# Patient Record
Sex: FEMALE | Race: WHITE | NOT HISPANIC OR LATINO | URBAN - METROPOLITAN AREA
[De-identification: names, ages, dates, MRNs, and addresses within clinical notes are randomized per-mention and may not be internally consistent; named-entity substitution may affect disease eponyms.]

---

## 2023-03-31 ENCOUNTER — EMERGENCY (EMERGENCY)
Facility: HOSPITAL | Age: 43
LOS: 1 days | Discharge: HOME | End: 2023-03-31
Attending: STUDENT IN AN ORGANIZED HEALTH CARE EDUCATION/TRAINING PROGRAM | Admitting: STUDENT IN AN ORGANIZED HEALTH CARE EDUCATION/TRAINING PROGRAM
Payer: COMMERCIAL

## 2023-03-31 VITALS
OXYGEN SATURATION: 100 % | HEART RATE: 100 BPM | DIASTOLIC BLOOD PRESSURE: 68 MMHG | SYSTOLIC BLOOD PRESSURE: 124 MMHG | RESPIRATION RATE: 18 BRPM | TEMPERATURE: 99 F

## 2023-03-31 PROCEDURE — 99284 EMERGENCY DEPT VISIT MOD MDM: CPT

## 2023-03-31 NOTE — ED ADULT TRIAGE NOTE - CHIEF COMPLAINT QUOTE
r/o Bartholin cyst    pt c/o pain and swelling since Wednesday.. worse today.   pmhx- bartholin cyst

## 2023-04-01 VITALS
TEMPERATURE: 100 F | SYSTOLIC BLOOD PRESSURE: 110 MMHG | HEART RATE: 96 BPM | DIASTOLIC BLOOD PRESSURE: 66 MMHG | RESPIRATION RATE: 18 BRPM | OXYGEN SATURATION: 100 %

## 2023-04-01 LAB
ALBUMIN SERPL ELPH-MCNC: 4 G/DL — SIGNIFICANT CHANGE UP (ref 3.3–5)
ALP SERPL-CCNC: 51 U/L — SIGNIFICANT CHANGE UP (ref 40–120)
ALT FLD-CCNC: 15 U/L — SIGNIFICANT CHANGE UP (ref 4–33)
ANION GAP SERPL CALC-SCNC: 10 MMOL/L — SIGNIFICANT CHANGE UP (ref 7–14)
AST SERPL-CCNC: 14 U/L — SIGNIFICANT CHANGE UP (ref 4–32)
BILIRUB SERPL-MCNC: 0.6 MG/DL — SIGNIFICANT CHANGE UP (ref 0.2–1.2)
BUN SERPL-MCNC: 4 MG/DL — LOW (ref 7–23)
CALCIUM SERPL-MCNC: 8.7 MG/DL — SIGNIFICANT CHANGE UP (ref 8.4–10.5)
CHLORIDE SERPL-SCNC: 105 MMOL/L — SIGNIFICANT CHANGE UP (ref 98–107)
CO2 SERPL-SCNC: 22 MMOL/L — SIGNIFICANT CHANGE UP (ref 22–31)
CREAT SERPL-MCNC: 0.65 MG/DL — SIGNIFICANT CHANGE UP (ref 0.5–1.3)
EGFR: 113 ML/MIN/1.73M2 — SIGNIFICANT CHANGE UP
GLUCOSE SERPL-MCNC: 106 MG/DL — HIGH (ref 70–99)
HCT VFR BLD CALC: 36.2 % — SIGNIFICANT CHANGE UP (ref 34.5–45)
HGB BLD-MCNC: 11.8 G/DL — SIGNIFICANT CHANGE UP (ref 11.5–15.5)
MCHC RBC-ENTMCNC: 31.5 PG — SIGNIFICANT CHANGE UP (ref 27–34)
MCHC RBC-ENTMCNC: 32.6 GM/DL — SIGNIFICANT CHANGE UP (ref 32–36)
MCV RBC AUTO: 96.5 FL — SIGNIFICANT CHANGE UP (ref 80–100)
NRBC # BLD: 0 /100 WBCS — SIGNIFICANT CHANGE UP (ref 0–0)
NRBC # FLD: 0 K/UL — SIGNIFICANT CHANGE UP (ref 0–0)
PLATELET # BLD AUTO: 187 K/UL — SIGNIFICANT CHANGE UP (ref 150–400)
POTASSIUM SERPL-MCNC: 3.7 MMOL/L — SIGNIFICANT CHANGE UP (ref 3.5–5.3)
POTASSIUM SERPL-SCNC: 3.7 MMOL/L — SIGNIFICANT CHANGE UP (ref 3.5–5.3)
PROT SERPL-MCNC: 6.6 G/DL — SIGNIFICANT CHANGE UP (ref 6–8.3)
RBC # BLD: 3.75 M/UL — LOW (ref 3.8–5.2)
RBC # FLD: 12.9 % — SIGNIFICANT CHANGE UP (ref 10.3–14.5)
SODIUM SERPL-SCNC: 137 MMOL/L — SIGNIFICANT CHANGE UP (ref 135–145)
WBC # BLD: 15.54 K/UL — HIGH (ref 3.8–10.5)
WBC # FLD AUTO: 15.54 K/UL — HIGH (ref 3.8–10.5)

## 2023-04-01 RX ORDER — SODIUM CHLORIDE 9 MG/ML
1000 INJECTION, SOLUTION INTRAVENOUS ONCE
Refills: 0 | Status: COMPLETED | OUTPATIENT
Start: 2023-04-01 | End: 2023-04-01

## 2023-04-01 RX ORDER — MORPHINE SULFATE 50 MG/1
4 CAPSULE, EXTENDED RELEASE ORAL ONCE
Refills: 0 | Status: DISCONTINUED | OUTPATIENT
Start: 2023-04-01 | End: 2023-04-01

## 2023-04-01 RX ORDER — ONDANSETRON 8 MG/1
4 TABLET, FILM COATED ORAL ONCE
Refills: 0 | Status: COMPLETED | OUTPATIENT
Start: 2023-04-01 | End: 2023-04-01

## 2023-04-01 RX ORDER — LIDOCAINE HYDROCHLORIDE AND EPINEPHRINE 10; 10 MG/ML; UG/ML
10 INJECTION, SOLUTION INFILTRATION; PERINEURAL ONCE
Refills: 0 | Status: DISCONTINUED | OUTPATIENT
Start: 2023-04-01 | End: 2023-04-01

## 2023-04-01 RX ORDER — KETOROLAC TROMETHAMINE 30 MG/ML
30 SYRINGE (ML) INJECTION ONCE
Refills: 0 | Status: DISCONTINUED | OUTPATIENT
Start: 2023-04-01 | End: 2023-04-01

## 2023-04-01 RX ORDER — LIDOCAINE HCL 20 MG/ML
10 VIAL (ML) INJECTION ONCE
Refills: 0 | Status: DISCONTINUED | OUTPATIENT
Start: 2023-04-01 | End: 2023-04-04

## 2023-04-01 RX ADMIN — MORPHINE SULFATE 4 MILLIGRAM(S): 50 CAPSULE, EXTENDED RELEASE ORAL at 00:53

## 2023-04-01 RX ADMIN — Medication 1 TABLET(S): at 03:50

## 2023-04-01 RX ADMIN — MORPHINE SULFATE 4 MILLIGRAM(S): 50 CAPSULE, EXTENDED RELEASE ORAL at 01:08

## 2023-04-01 RX ADMIN — SODIUM CHLORIDE 1000 MILLILITER(S): 9 INJECTION, SOLUTION INTRAVENOUS at 00:53

## 2023-04-01 RX ADMIN — ONDANSETRON 4 MILLIGRAM(S): 8 TABLET, FILM COATED ORAL at 05:06

## 2023-04-01 RX ADMIN — Medication 30 MILLIGRAM(S): at 00:53

## 2023-04-01 RX ADMIN — Medication 30 MILLIGRAM(S): at 01:08

## 2023-04-01 NOTE — CONSULT NOTE ADULT - SUBJECTIVE AND OBJECTIVE BOX
GYN Consult Note: Incomplete     42y G_P_  LMP * presents with   HPI:      OB/GYN HISTORY:   Physician:   Ob:   - G1:   - G2:   Gyn: Denies fibroids, cysts, PCOS, endometriosis, or history of genital lesions   PMH: Denies  PAST MEDICAL & SURGICAL HISTORY:    PSH: Denies   Meds:  MEDICATIONS  (STANDING):  lidocaine 1% Injectable 10 milliLiter(s) Local Injection Once    MEDICATIONS  (PRN):    Allergies:   Allergies    No Known Allergies    Intolerances          REVIEW OF SYSTEMS  General: denies fevers, chills, tiredness  Skin/Breast: denies breast pain  Respiratory and Thorax: denies shortness of breath or cough  Cardiovascular: denies chest pain, palpitations  Gastrointestinal: denies abdominal pain, nausea/ vomiting	  Genitourinary: denies dysuria, increased urinary frequency, urgency, abnormal vaginal discharge,   Constitutional, Cardiovascular, Respiratory, Gastrointestinal, Genitourinary, Musculoskeletal and Integumentary review of systems are normal except as noted. 	      Vital Signs Last 24 Hrs  T(C): 37.7 (01 Apr 2023 00:59), Max: 37.7 (01 Apr 2023 00:59)  T(F): 99.9 (01 Apr 2023 00:59), Max: 99.9 (01 Apr 2023 00:59)  HR: 98 (01 Apr 2023 00:59) (98 - 100)  BP: 111/62 (01 Apr 2023 00:59) (111/62 - 124/68)  BP(mean): --  RR: 18 (01 Apr 2023 00:59) (18 - 18)  SpO2: 100% (01 Apr 2023 00:59) (100% - 100%)    Parameters below as of 01 Apr 2023 00:59  Patient On (Oxygen Delivery Method): room air        PHYSICAL EXAM: Chaperoned w/ RN at bedside.   Gen: NAD, alert and oriented x 3  Cardiovascular: regular   Respiratory: breathing comfortably on RA  Abd: soft, non tender, non-distended  Pelvic: closed/long, no CMT, Uterus: normal size, non tender  Adnexa: non tender, no palpable masses  Extremities: NTBL  Skin: warm and well perfused      LABS:                RADIOLOGY & ADDITIONAL STUDIES:   GYN Consult Note: Incomplete - recs at bottom.     42y G_P_  LMP * presents with   HPI:      OB/GYN HISTORY:   Physician:   Ob:   - G1:   - G2:   Gyn: Denies fibroids, cysts, PCOS, endometriosis, or history of genital lesions   PMH: Denies  PAST MEDICAL & SURGICAL HISTORY:    PSH: Denies   Meds:  MEDICATIONS  (STANDING):  lidocaine 1% Injectable 10 milliLiter(s) Local Injection Once    MEDICATIONS  (PRN):    Allergies:   Allergies    No Known Allergies    Intolerances          REVIEW OF SYSTEMS  General: denies fevers, chills, tiredness  Skin/Breast: denies breast pain  Respiratory and Thorax: denies shortness of breath or cough  Cardiovascular: denies chest pain, palpitations  Gastrointestinal: denies abdominal pain, nausea/ vomiting	  Genitourinary: denies dysuria, increased urinary frequency, urgency, abnormal vaginal discharge,   Constitutional, Cardiovascular, Respiratory, Gastrointestinal, Genitourinary, Musculoskeletal and Integumentary review of systems are normal except as noted. 	      Vital Signs Last 24 Hrs  T(C): 37.7 (01 Apr 2023 00:59), Max: 37.7 (01 Apr 2023 00:59)  T(F): 99.9 (01 Apr 2023 00:59), Max: 99.9 (01 Apr 2023 00:59)  HR: 98 (01 Apr 2023 00:59) (98 - 100)  BP: 111/62 (01 Apr 2023 00:59) (111/62 - 124/68)  BP(mean): --  RR: 18 (01 Apr 2023 00:59) (18 - 18)  SpO2: 100% (01 Apr 2023 00:59) (100% - 100%)    Parameters below as of 01 Apr 2023 00:59  Patient On (Oxygen Delivery Method): room air        PHYSICAL EXAM: Chaperoned w/ RN at bedside.   Gen: NAD, alert and oriented x 3  Cardiovascular: regular   Respiratory: breathing comfortably on RA  Abd: soft, non tender, non-distended  Pelvic: closed/long, no CMT, Uterus: normal size, non tender  Adnexa: non tender, no palpable masses  Extremities: NTBL  Skin: warm and well perfused      LABS:                RADIOLOGY & ADDITIONAL STUDIES:   GYN Consult Note: Incomplete - recs at bottom.     42y   presents with Bartholin gland abscess   HPI: Pt reports a chronic history of bartholin gland cyst/ abscess Pt reports a prior history of prior Bartholin gland absces requiring incision and drainage utilizing a word catheter previously. Pt reports that she has a palpable cyst at in her left labia that is non tender at a baseline, but does report that sometimes she can not feel it. Pt was previously recommended to undergo Bartholin gland excision with concern for "to much scar tissue" and recurrence of the problem with repeat I&D's. Pt is not interested in surgical excision and has been following with an osteopathic doctor who has preformed intravaginal manipulation. Pt reports satisfaction with this approach as she has not had an occurrence in the last 5 years. Most recently patient states the lesion began to become progressive in size and more painful since Thursday evening. Pt reports a 45 min sitz bath on Friday morning. Denies any spontaneous drainage. Pain has progressed that OTC medications brings little relief and patient has pain with ambulation. Pt was advised by her private doctor to present to the ED for antibiotics.     OB/GYN HISTORY:   Physician: Utilizes [facility] urgent care in [place] for gyn care. Previously a patient of Dr. Cortes (last seen- )   Ob:   - G1: C/S,    - G2: GABI w/ D&C,    - G3: C/S,    Gyn: As above.   PMH: Denies    PSH: C/S (, )   Meds: Vitamins   Allergies: No Known Allergies    REVIEW OF SYSTEMS  General: Denies fevers, chills,   Gastrointestinal: denies abdominal pain, nausea/ vomiting	  Genitourinary: denies dysuria, increased urinary frequency, urgency, Able to void spontaneously with a normal stream.   Constitutional, Cardiovascular, Respiratory, Gastrointestinal, Genitourinary, Musculoskeletal and Integumentary review of systems are normal except as noted. 	      Vital Signs Last 24 Hrs  T(C): 37.7 (2023 00:59), Max: 37.7 (2023 00:59)  T(F): 99.9 (2023 00:59), Max: 99.9 (2023 00:59)  HR: 98 (2023 00:59) (98 - 100)  BP: 111/62 (2023 00:59) (111/62 - 124/68)  BP(mean): --  RR: 18 (2023 00:59) (18 - 18)  SpO2: 100% (2023 00:59) (100% - 100%)    Parameters below as of 2023 00:59  Patient On (Oxygen Delivery Method): room air    PHYSICAL EXAM:   Gen: NAD, alert and oriented x 3  Cardiovascular: regular   Respiratory: breathing comfortably on RA  Pelvic: 3cm by 3cm bartholin gland abscess. Tender to palpation. Tissue without tension or area of drainage. Concern for induration.   Extremities: NTBL  Skin: warm and well perfused      LABS:              RADIOLOGY & ADDITIONAL STUDIES:   GYN Consult Note: Incomplete - recs at bottom.     42y   presents with Bartholin gland abscess   HPI: Pt reports a chronic history of bartholin gland cyst/ abscess Pt reports a prior history of prior Bartholin gland abscess requiring incision and drainage utilizing a word catheter previously. Pt reports that she has a palpable cyst at in her left labia that is non tender at a baseline, but does report that sometimes she can not feel it. Pt was previously recommended to undergo Bartholin gland excision with concern for "too much scar tissue" and recurrence of the problem with repeat I&D's. Pt is not interested in surgical excision and has been following with an osteopathic doctor who has preformed intravaginal manipulation. Pt reports satisfaction with this approach as she has not had an occurrence in the last 5 years. Most recently patient states the lesion began to become progressive in size and more painful since Thursday evening. Pt reports a 45 min sitz bath on Friday morning only. Denies any spontaneous drainage. Pain has progressed that OTC medications brings little relief and patient has pain with ambulation. Pt was advised by her private doctor to present to the ED for antibiotics.     OB/GYN HISTORY:   Physician: Utilizes GYN urgent care in [place] for gyn care. Previously a patient of Dr. Cortes (last seen- )   Ob:   - G1: C/S,    - G2: GABI w/ D&C,    - G3: C/S,    Gyn: As above.   PMH: Denies    PSH: C/S (, )   Meds: Vitamins   Allergies: No Known Allergies    REVIEW OF SYSTEMS  General: Denies fevers, chills,   Gastrointestinal: denies abdominal pain, nausea/ vomiting	  Genitourinary: denies dysuria, increased urinary frequency, urgency, Able to void spontaneously with a normal stream.   Constitutional, Cardiovascular, Respiratory, Gastrointestinal, Genitourinary, Musculoskeletal and Integumentary review of systems are normal except as noted. 	      Vital Signs Last 24 Hrs  T(C): 37.7 (2023 00:59), Max: 37.7 (2023 00:59)  T(F): 99.9 (2023 00:59), Max: 99.9 (2023 00:59)  HR: 98 (2023 00:59) (98 - 100)  BP: 111/62 (2023 00:59) (111/62 - 124/68)  BP(mean): --  RR: 18 (2023 00:59) (18 - 18)  SpO2: 100% (2023 00:59) (100% - 100%)    Parameters below as of 2023 00:59  Patient On (Oxygen Delivery Method): room air    PHYSICAL EXAM:   Gen: NAD, alert and oriented x 3  Cardiovascular: regular   Respiratory: breathing comfortably on RA  Pelvic: 3cm by 3cm bartholin gland cyst, mild to moderate tenderness to palpation. Tissue without tension or area of pointing,  Concern for induration.   Extremities: NTBL  Skin: warm and well perfused              RADIOLOGY & ADDITIONAL STUDIES:

## 2023-04-01 NOTE — ED ADULT NURSE NOTE - OBJECTIVE STATEMENT
Received pt in intake room 9 with right Bartholin's cyst. Patient alert and oriented x3 . Placed 20 G IV to the Left AC. Meds given as ordered. IVF infusing well.

## 2023-04-01 NOTE — ED PROVIDER NOTE - CLINICAL SUMMARY MEDICAL DECISION MAKING FREE TEXT BOX
42-year-old female past medical history of Bartholin gland cyst 5 to 6 years ago requiring drainage who is presenting to the emergency department with pain in the left labia.  Feels like the last time she had a Bartholin cyst.  Pain is severe.  She also has generalized malaise.  Has been going on for 5 to 6 days.  Took some Advil for the pain but did not help.  Patient is uncomfortable appearing, vital signs are within normal limits.  Patient has an area of fluctuance in the left inner labia consistent with Bartholin gland cyst.  It is tender to palpation.  Due to the inability to find a Word catheter in the OR, will consult gynecology for drainage of cyst given the fact they likely have a Word catheter.  Furthermore patient is requesting gynecology consultationControl with morphine and Toradol in advance of procedure.  We will give a liter of empiric fluids as well.  Anticipate discharge with OB follow-up.

## 2023-04-01 NOTE — ED PROVIDER NOTE - NSFOLLOWUPINSTRUCTIONS_ED_ALL_ED_FT
take acetaminophen 650 mg every 6 hours and ibuprofen 400 mg every 6 hours with food. Both of these medications work differently to treat pain and inflammation. Please note that these medications are both available over the counter at most pharmacies without a doctor's prescription.    Bartholin Cyst    WHAT YOU NEED TO KNOW:    A Bartholin cyst is a lump near the opening to your vagina. You may have pain in this area when you walk or have sex. A Bartholin cyst is caused by blockage of your Bartholin gland. You have a Bartholin gland on each side of your vagina. The glands produce fluid to moisten your vagina. Over time the fluid can build up in the gland and form a cyst. The cyst may become infected. You may be at risk for a Bartholin cyst if you have a sexually transmitted infection. An injury or surgery near your vagina may also increase you risk.    DISCHARGE INSTRUCTIONS:    Contact your gynecologist or healthcare provider if:    You have a fever.    Your cyst gets larger or becomes more painful.    Your cyst returns after treatment.    Your drain falls out.    You have pus, redness, or swelling where the cyst was drained.    You have questions or concerns about your condition or care.  Medicines: You may need any of the following:    Antibiotics help prevent or treat a bacterial infection.    NSAIDs, such as ibuprofen, help decrease swelling, pain, and fever. NSAIDs can cause stomach bleeding or kidney problems in certain people. If you take blood thinner medicine, always ask your healthcare provider if NSAIDs are safe for you. Always read the medicine label and follow directions.    Take your medicine as directed. Contact your healthcare provider if you think your medicine is not helping or if you have side effects. Tell him or her if you are allergic to any medicine. Keep a list of the medicines, vitamins, and herbs you take. Include the amounts, and when and why you take them. Bring the list or the pill bottles to follow-up visits. Carry your medicine list with you in case of an emergency.  Self-care if your cyst was not drained:    Take a sitz bath 3 to 4 times each day or as directed. A sitz bath may help relieve swelling and pain. It will also help open your Bartholin glands so they drain normally. Place a clean towel on the bottom of your bath tub. Fill your bath tub with warm water up to your hips. You can also buy a sitz bath that fits in your toilet. Sit in the water for 10 minutes.    Apply a warm compress to your cyst. This may relieve swelling and pain. A warm compress will also help open your Bartholin glands so they drain normally. Wet a washcloth in warm, but not hot, water. Apply the compress for 10 minutes. Repeat 4 times each day.    Keep the area around your vagina clean. Always wipe front to back. Shower once a day. Gently pat the area dry after a shower.  Self-care after an incision and drainage of your cyst:    Take a sitz bath in 24 to 48 hours or as directed. You may need to wait to take a sitz bath until after your packing is removed. A sitz bath may help relieve swelling and pain. Take a sitz bath 3 to 4 times each day for 3 days. Place a clean towel on the bottom of your bath tub. Fill your bath tub with warm water up to your hips. You can also buy a sitz bath that fits in your toilet. Sit in the water for 10 minutes.    Wear a sanitary pad to absorb drainage from your wound. You may have drainage for a few weeks after your cyst is drained.    Ask your healthcare provider if it is okay to have sex. Sex may cause your drain to fall out. It may also increase your risk for an infection.    Keep the area around your vagina clean. Always wipe front to back. Shower once a day. Gently pat the area dry after a shower.  Follow up with your healthcare provider as directed: If packing was placed in your wound, return in 24 to 48 hours to have it removed. If a drain was placed, you will need to return in 4 to 6 weeks to have it removed. Write down your questions so you remember to ask them during your visits. establish care with an OB/GYN this week. Would recommend [facility] [phone]) or [facility] ()    take acetaminophen 650 mg every 6 hours and ibuprofen 400 mg every 6 hours with food. Both of these medications work differently to treat pain and inflammation. Please note that these medications are both available over the counter at most pharmacies without a doctor's prescription.    Bartholin Cyst    WHAT YOU NEED TO KNOW:    A Bartholin cyst is a lump near the opening to your vagina. You may have pain in this area when you walk or have sex. A Bartholin cyst is caused by blockage of your Bartholin gland. You have a Bartholin gland on each side of your vagina. The glands produce fluid to moisten your vagina. Over time the fluid can build up in the gland and form a cyst. The cyst may become infected. You may be at risk for a Bartholin cyst if you have a sexually transmitted infection. An injury or surgery near your vagina may also increase you risk.    DISCHARGE INSTRUCTIONS:    Contact your gynecologist or healthcare provider if:    You have a fever.    Your cyst gets larger or becomes more painful.    Your cyst returns after treatment.    Your drain falls out.    You have pus, redness, or swelling where the cyst was drained.    You have questions or concerns about your condition or care.  Medicines: You may need any of the following:    Antibiotics help prevent or treat a bacterial infection.    NSAIDs, such as ibuprofen, help decrease swelling, pain, and fever. NSAIDs can cause stomach bleeding or kidney problems in certain people. If you take blood thinner medicine, always ask your healthcare provider if NSAIDs are safe for you. Always read the medicine label and follow directions.    Take your medicine as directed. Contact your healthcare provider if you think your medicine is not helping or if you have side effects. Tell him or her if you are allergic to any medicine. Keep a list of the medicines, vitamins, and herbs you take. Include the amounts, and when and why you take them. Bring the list or the pill bottles to follow-up visits. Carry your medicine list with you in case of an emergency.  Self-care if your cyst was not drained:    Take a sitz bath 3 to 4 times each day or as directed. A sitz bath may help relieve swelling and pain. It will also help open your Bartholin glands so they drain normally. Place a clean towel on the bottom of your bath tub. Fill your bath tub with warm water up to your hips. You can also buy a sitz bath that fits in your toilet. Sit in the water for 10 minutes.    Apply a warm compress to your cyst. This may relieve swelling and pain. A warm compress will also help open your Bartholin glands so they drain normally. Wet a washcloth in warm, but not hot, water. Apply the compress for 10 minutes. Repeat 4 times each day.    Keep the area around your vagina clean. Always wipe front to back. Shower once a day. Gently pat the area dry after a shower.  Self-care after an incision and drainage of your cyst:    Take a sitz bath in 24 to 48 hours or as directed. You may need to wait to take a sitz bath until after your packing is removed. A sitz bath may help relieve swelling and pain. Take a sitz bath 3 to 4 times each day for 3 days. Place a clean towel on the bottom of your bath tub. Fill your bath tub with warm water up to your hips. You can also buy a sitz bath that fits in your toilet. Sit in the water for 10 minutes.    Wear a sanitary pad to absorb drainage from your wound. You may have drainage for a few weeks after your cyst is drained.    Ask your healthcare provider if it is okay to have sex. Sex may cause your drain to fall out. It may also increase your risk for an infection.    Keep the area around your vagina clean. Always wipe front to back. Shower once a day. Gently pat the area dry after a shower.  Follow up with your healthcare provider as directed: If packing was placed in your wound, return in 24 to 48 hours to have it removed. If a drain was placed, you will need to return in 4 to 6 weeks to have it removed. Write down your questions so you remember to ask them during your visits.

## 2023-04-01 NOTE — CONSULT NOTE ADULT - ASSESSMENT
- Pt needs to establish care with an OB/GYN this week. Would recommend [facility] [phone]) or [facility] ()      Pt reports a chronic history of bartholin gland cyst/ abscess presenting today with recurrence and symptom progression over the last 24 hours.     - Discussed the recurrent nature of the problem and the recommendation for a Bartholin gland excision was reviewed. Pt made aware that in the setting of recurrence in women over 40 a biopsy is recommended due to the increased risk of malignancy.  - Discussed the use of conservative therapy with antibiotics and sitz baths verses marsupialization of the bartholin gland at bedside. Risks and benefits of options were discussed.  Pt with inadequate trial of conservative therapy. Plan for patient to be discharged home with Augmentin 875mg BID x7 days with sitz baths TID or more frequent as tolerated. Pt will follow up with osteopathic doctor.   - Pt is afebrile, but demonstrates a leukocytosis to 15. Pt needs to establish care with an OB/GYN this week. Would recommend [facility] [phone]) or [facility] ()    Pt seen with Dr. Itzel Cortez, PGY-2   Pt reports a chronic history of bartholin gland cyst/ abscess presenting today with recurrence and symptom progression over the last 24 hours.     - Discussed the recurrent nature of the problem and the recommendation for a Bartholin gland excision was reviewed. Pt made aware that in the setting of recurrence in women over 40 a biopsy is recommended due to the increased risk of malignancy.  - Discussed the use of conservative therapy with antibiotics and sitz baths verses marsupialization of the bartholin gland at bedside. Risks and benefits of options were discussed.  Pt with inadequate trial of conservative therapy. Plan for patient to be discharged home with Augmentin 875mg BID x7 days with sitz baths TID or more frequent as tolerated. Pt will follow up with osteopathic doctor with whom pt had been getting care for this problem.   - Pt is afebrile, but demonstrates a leukocytosis to 15. Pt needs to establish care with an OB/GYN this week. Would recommend [facility] [phone]) or [facility] ()    Pt seen and examined with Dr. Itzel Cortez, PGY-2     ATT:  This pt was seen and examined and counselled by me today. I made alterations to the history and physical exam as needed. Otherwise agree with resident findings, assessment and plan documented in resident note.

## 2023-04-01 NOTE — CONSULT NOTE ADULT - ATTENDING COMMENTS
ATT:  Pt seen by me with resident. I agree with findings, assessment and plan documented in the resident note

## 2023-04-01 NOTE — ED PROVIDER NOTE - PATIENT PORTAL LINK FT
You can access the FollowMyHealth Patient Portal offered by [facility] by registering at the following website: [email or web address]. By joining [employer]’s FollowMyHealth portal, you will also be able to view your health information using other applications (apps) compatible with our system. You can access the FollowMyHealth Patient Portal offered by [facility] by registering at the following website: [email or web address]. By joining "[employer]"’s FollowMyHealth portal, you will also be able to view your health information using other applications (apps) compatible with our system.

## 2023-08-07 PROBLEM — Z00.00 ENCOUNTER FOR PREVENTIVE HEALTH EXAMINATION: Status: ACTIVE | Noted: 2023-08-07

## 2023-08-09 NOTE — PLAN
[TextEntry] : Tentative Plan: Reviewed prior MG/US imaging findings and discussed results with patient. Reviewed prior L US benign bx biopsy results with the patient. She is to get 6M f/u L US to ensure stability. If stable, she is to return in 1 year for annual B/l MG & US. re-examination and office visit.

## 2023-08-11 ENCOUNTER — TRANSCRIPTION ENCOUNTER (OUTPATIENT)
Age: 43
End: 2023-08-11

## 2023-08-11 ENCOUNTER — APPOINTMENT (OUTPATIENT)
Dept: BREAST CENTER | Facility: CLINIC | Age: 43
End: 2023-08-11
Payer: COMMERCIAL

## 2023-08-11 ENCOUNTER — NON-APPOINTMENT (OUTPATIENT)
Age: 43
End: 2023-08-11

## 2023-08-11 VITALS
BODY MASS INDEX: 21 KG/M2 | WEIGHT: 123 LBS | HEART RATE: 76 BPM | SYSTOLIC BLOOD PRESSURE: 106 MMHG | HEIGHT: 64 IN | DIASTOLIC BLOOD PRESSURE: 69 MMHG

## 2023-08-11 DIAGNOSIS — Z78.9 OTHER SPECIFIED HEALTH STATUS: ICD-10-CM

## 2023-08-11 DIAGNOSIS — Z80.8 FAMILY HISTORY OF MALIGNANT NEOPLASM OF OTHER ORGANS OR SYSTEMS: ICD-10-CM

## 2023-08-11 DIAGNOSIS — Z80.0 FAMILY HISTORY OF MALIGNANT NEOPLASM OF DIGESTIVE ORGANS: ICD-10-CM

## 2023-08-11 PROCEDURE — 99205 OFFICE O/P NEW HI 60 MIN: CPT

## 2023-08-14 ENCOUNTER — APPOINTMENT (OUTPATIENT)
Dept: BREAST CENTER | Facility: CLINIC | Age: 43
End: 2023-08-14

## 2023-08-15 NOTE — PHYSICAL EXAM
[Normocephalic] : normocephalic [EOMI] : extra ocular movement intact [Supple] : supple [No Supraclavicular Adenopathy] : no supraclavicular adenopathy [No Cervical Adenopathy] : no cervical adenopathy [de-identified] : Bilateral breast/axilla/supraclavicular area: No masses, discharge, or adenopathy [de-identified] : R breast/axilla/supraclavicular area: No masses, discharge, or adenopathy [de-identified] : L breast/axilla/supraclavicular area: No masses or adenopathy L 3:00 duct brown nipple discharge elicited with very light pressure to breast on exam (hemoccult positive)

## 2023-08-15 NOTE — HISTORY OF PRESENT ILLNESS
[FreeTextEntry1] : Patient is a 43 yo F who presents today for initial evaluation of L intermittent spontaneous nipple discharge since 5/2023 and L benign US bx from 6/28/23. Patient underwent diagnostic imaging for spontaneous L clear nipple discharge 6/2023. Patient states she has noticed in the past month that the discharge has become brown and intermittently sees spotting on her bra. She has not had prior breast imaging. She denies prior breast surgeries or biopsies. Pt with no known medical history. Denies taking medications or supplements. Family history of breast cancer in mother, BRCA negative 2023 (Age 73). Patient denies palpable masses and skin changes.  PATRICIA Lifetime Risk- 31.9%  6/22/23: b/l dxMG/US (L nipple discharge)- extremely dense, L lateral scattered groups of layering and punctate calcs (rec 6M f/u L dxMG), US-multiple subcm cysts b/l (rec 6 M f/u b/l US), L 9:00 0FN 0.4 cm subdermal oval partially indistinct hypoechogenic shadowing mass (rec L US bx). BIRADS 4. 6/28/23: L US bx- L 9:00 0.4 cm hypoechoic mass (S clip)- Benign PASH and usual epithelial hyperplasia. Benign & Concordant.

## 2023-08-15 NOTE — PAST MEDICAL HISTORY
[Menarche Age ____] : age at menarche was [unfilled] [Definite ___ (Date)] : the last menstrual period was [unfilled] [Regular Cycle Intervals] : have been regular [Total Preg ___] : G[unfilled] [Live Births ___] : P[unfilled]  [Age At Live Birth ___] : Age at live birth: [unfilled] [History of Hormone Replacement Treatment] : has no history of hormone replacement treatment [FreeTextEntry2] : 1 miscarriage [FreeTextEntry6] : no [FreeTextEntry7] : no [FreeTextEntry8] : yes

## 2023-08-16 ENCOUNTER — APPOINTMENT (OUTPATIENT)
Dept: HEMATOLOGY ONCOLOGY | Facility: CLINIC | Age: 43
End: 2023-08-16

## 2023-08-16 NOTE — DISCUSSION/SUMMARY
[FreeTextEntry1] : The visit was provided via telehealth using real-time 2-way audio visual technology. The patient, Mary Madison, was located at home, [place], [place], at the time of the visit. The Genetic Counselor, Kacey Desai, was located at the medical office located in [place], [place]. The patient and the Genetic Counselor both participated in the telehealth encounter. Darleen Mendoza NP was also present during this session. Consent for telehealth services was given on 2023 by the patient, Mary Madison.   REASON FOR CONSULT Mary Madison is a 42-year-old female who was referred by Dr. Maria Del Carmen Tripathi for cancer genetic counseling and risk assessment due to a family history of pancreatic cancer, thyroid cancer, and breast cancer.   RELEVANT MEDICAL HISTORY Ms. Madison is a healthy individual who has never had cancer. She has a family history of cancer, see below.  OTHER MEDICAL AND SURGICAL HISTORY: -	Medical History: No major medical history reported -	Surgical History:  section (x2), appendectomy, eye surgery  PAST OB/GYN HISTORY: Obstetrical History:  Age at Menarche: 12 Premenopausal  Age at First Live Birth: 32 Oral Contraceptive Use: No Hormone Replacement Therapy: No  CANCER SCREENING HISTORY:   Breast:  -	Mammography: last 2023, scattered calcifications were identified in the left breast and benign calcifications were identified in the right breast. Left breast mammogram was recommended in 6 months. -	Sonography: last 2023, bilateral cysts were identified as well as a hypoechogenic shadowing mass in the left breast. Ultrasound-guided biopsy was recommended. Follow-up imaging in 6 months was recommended for bilateral cysts. -	MRI: last 2023, amorphous calcifications identified in the left breast. Biopsy was recommended.  -	Biopsies: 2023-Left, benign pseudoangiomatous stromal hyperplasia and usual epithelial hyperplasia.  GYN: -	Pelvic Examination: last 2023, reportedly normal -	Sonography: Patient reports undergoing a TVUS once multiple years ago for follow-up after a miscarriage. Patient reports results were normal. -	CA-125: No Colon: -	Colonoscopy: No -	Upper Endoscopy: No  Skin:   -	FBSE: No -	Lesions biopsied/removed: No  SOCIAL HISTORY: -	Tobacco-product use: No  FAMILY HISTORY: Ashkenazi Muslim ancestry and consanguinity were denied.  A detailed family history of cancer was ascertained. Relevant diagnoses are detailed below and in the scanned pedigree.   Of note, Ms. Madison reports that her mother who was diagnosed with breast cancer at 72 years old had genetic testing for the BRCA1 and BRCA2 genes in . She reports that results were negative. Report was not available for review at today's appointment.  	 	RISK ASSESSMENT: Ms. Madison's family history of pancreatic cancer and thyroid cancer is suggestive of an inherited predisposition to pancreatic cancer and/or thyroid cancer and related cancers. We recommended genetic testing for genes associated with pancreatic cancer, thyroid cancer, breast cancer, and gynecological cancers. This test analyzes [30] genes: APC, EDWIN, BARD1, BMPR1A, BRCA1, BRCA2, BRIP1, CDH1, CDKN2A, CHEK2, DICER1, EPCAM, MEN1, MLH1, MSH2, MSH6, NF1, PALB2, PMS2, LEKPX1Q, PTEN, RAD51C, RAD51D, RET, SMAD4, STK11, TP53, TSC1, TSC2, and VHL.   We discussed the risks, benefits and limitations, and implications of genetic testing. We also discussed the psychosocial implications of genetic testing. Possible test results were reviewed with Ms. Madison along with associated medical management options. The Genetic Information Non-discrimination Act (ANGELINA) was also reviewed.   Ms. Madison verbally consented to the above mentioned genetic testing panel. Informed consent form was emailed to the patient, and a saliva sample kit will be mailed to the patient. Once the sample has been collected and sent out, Ms. Madison will inform us.   PLAN:  1.	Saliva kit was sent to Ms. Madison's home for collection. Once collected and dropped off, patient will inform us.  2.	Ms. Madison was sent a copy of the informed consent via email to be filled, signed, and returned to us. 3.	We will contact Ms. Madison once the results are available and will schedule a follow-up appointment, as needed. Results generally return in 2-3 weeks from the day the sample is received in the lab.  For any additional questions please call Cancer Genetics at [phone].    Kacey Desai MS, [facility] Genetic Counselor, Cancer Genetics   CC:  Dr. Maria Del Carmen Tripathi

## 2023-08-24 ENCOUNTER — NON-APPOINTMENT (OUTPATIENT)
Age: 43
End: 2023-08-24

## 2023-08-30 ENCOUNTER — APPOINTMENT (OUTPATIENT)
Dept: BREAST CENTER | Facility: CLINIC | Age: 43
End: 2023-08-30
Payer: COMMERCIAL

## 2023-08-30 VITALS
SYSTOLIC BLOOD PRESSURE: 124 MMHG | BODY MASS INDEX: 21.34 KG/M2 | DIASTOLIC BLOOD PRESSURE: 80 MMHG | HEART RATE: 93 BPM | WEIGHT: 125 LBS | HEIGHT: 64 IN

## 2023-08-30 DIAGNOSIS — R92.8 OTHER ABNORMAL AND INCONCLUSIVE FINDINGS ON DIAGNOSTIC IMAGING OF BREAST: ICD-10-CM

## 2023-08-30 PROCEDURE — 99214 OFFICE O/P EST MOD 30 MIN: CPT

## 2023-08-30 NOTE — PHYSICAL EXAM
[Normocephalic] : normocephalic [EOMI] : extra ocular movement intact [Supple] : supple [No Supraclavicular Adenopathy] : no supraclavicular adenopathy [No Cervical Adenopathy] : no cervical adenopathy [de-identified] : small breasted [de-identified] : R breast/axilla/supraclavicular area: No masses, discharge, or adenopathy [de-identified] : L breast/axilla/supraclavicular area: No masses, discharge, or adenopathy.  No discharge could be elicited on examination today

## 2023-08-30 NOTE — HISTORY OF PRESENT ILLNESS
[FreeTextEntry1] : Patient is a 43 yo F who presents today for pre-op discussion and evaluation. She has h/o L intermittent spontaneous nipple discharge since 5/2023 and L benign US bx from 6/28/23. Pt underwent MRI for further evaluation of nipple discharge, given no suspicious findings, L stereo bx was recommended for L calcs found on diagnostic MG from 6/2023. S/p L stereo bx x 2 of 2:30 posterior depth and 3:00 anterior aspect of calcs, which yielded focal ADH/FEA. She denies prior breast surgeries. Pt with no known medical history. Denies taking medications or supplements. Family history of breast cancer in mother, BRCA negative 2023 (Age 73). Patient denies palpable masses or skin changes.  She also states she has not had any further discharge from the left breast for the 2 weeks.  PATRICIA Lifetime Risk- 31.9%  6/22/23: b/l dxMG/US (L nipple discharge)- extremely dense, L lateral scattered groups of layering and punctate calcs (rec 6M f/u L dxMG), US-multiple subcm cysts b/l (rec 6 M f/u b/l US), L 9:00 0FN 0.4 cm subdermal oval partially indistinct hypoechogenic shadowing mass (rec L US bx). BIRADS 4. 6/28/23: L US bx- L 9:00 0.4 cm hypoechoic mass (S clip)- Benign PASH and usual epithelial hyperplasia. Benign & Concordant. 8/16/23: MRI- heterogeneously dense, AZRA. No axillary or internal mammary adenopathy, L 9:00 tissue marker clip not assoc w/ suspicious enhancement. (Given L indeterminate calcs on MG and L pathologic nipple discharge rec L dxMG and L stereo bx). BIRADS 4. 8/23/23: L stereo bx x 2: SITE 1) L 2:30 posterior depth micro calcs (cork clip)- Focal ADH in a background of FEA with assoc calcs and fibrocystic changes. High Risk & Concordant. Rec surgical consult. SITE 2) L 3:00 anterior aspect of micro calcs (dumbbell clip)- Focal ADH in background of FEA w/ assoc calcs, fibrocystic changes. High Risk & Concordant. Rec surgical consult. **Of note, there are additional microcalcs anterior to the anterior clip. The anterior clip (dumbbell) is adjacent to a prominent cluster, these were sampled, the clip is at the edge of this cluster. It should be excised. There are additional microcalcs anterior to the anterior cluster. If there is no malignancy at surgery, then they do not need to be excised.

## 2023-09-15 ENCOUNTER — NON-APPOINTMENT (OUTPATIENT)
Age: 43
End: 2023-09-15

## 2023-10-10 ENCOUNTER — NON-APPOINTMENT (OUTPATIENT)
Age: 43
End: 2023-10-10

## 2023-10-16 ENCOUNTER — APPOINTMENT (OUTPATIENT)
Dept: BREAST CENTER | Facility: CLINIC | Age: 43
End: 2023-10-16
Payer: COMMERCIAL

## 2023-10-16 VITALS
BODY MASS INDEX: 21.85 KG/M2 | SYSTOLIC BLOOD PRESSURE: 117 MMHG | WEIGHT: 128 LBS | DIASTOLIC BLOOD PRESSURE: 76 MMHG | HEART RATE: 74 BPM | HEIGHT: 64 IN

## 2023-10-16 DIAGNOSIS — N64.52 NIPPLE DISCHARGE: ICD-10-CM

## 2023-10-16 PROCEDURE — 99214 OFFICE O/P EST MOD 30 MIN: CPT

## 2023-10-17 ENCOUNTER — APPOINTMENT (OUTPATIENT)
Dept: BREAST CENTER | Facility: AMBULATORY SURGERY CENTER | Age: 43
End: 2023-10-17
Payer: COMMERCIAL

## 2023-10-17 ENCOUNTER — RESULT REVIEW (OUTPATIENT)
Age: 43
End: 2023-10-17

## 2023-10-17 PROCEDURE — 14000 TIS TRNFR TRUNK 10 SQ CM/<: CPT | Mod: LT

## 2023-10-17 PROCEDURE — 19301 PARTIAL MASTECTOMY: CPT | Mod: LT

## 2023-10-17 PROCEDURE — 76098 X-RAY EXAM SURGICAL SPECIMEN: CPT | Mod: 26

## 2023-10-24 ENCOUNTER — NON-APPOINTMENT (OUTPATIENT)
Age: 43
End: 2023-10-24

## 2023-10-25 ENCOUNTER — APPOINTMENT (OUTPATIENT)
Dept: BREAST CENTER | Facility: CLINIC | Age: 43
End: 2023-10-25
Payer: COMMERCIAL

## 2023-10-25 ENCOUNTER — APPOINTMENT (OUTPATIENT)
Dept: BREAST CENTER | Facility: CLINIC | Age: 43
End: 2023-10-25

## 2023-10-25 VITALS
SYSTOLIC BLOOD PRESSURE: 102 MMHG | WEIGHT: 127 LBS | BODY MASS INDEX: 21.68 KG/M2 | HEIGHT: 64 IN | HEART RATE: 92 BPM | DIASTOLIC BLOOD PRESSURE: 69 MMHG

## 2023-10-25 PROCEDURE — 99024 POSTOP FOLLOW-UP VISIT: CPT

## 2023-10-26 ENCOUNTER — NON-APPOINTMENT (OUTPATIENT)
Age: 43
End: 2023-10-26

## 2023-11-06 ENCOUNTER — APPOINTMENT (OUTPATIENT)
Dept: PLASTIC SURGERY | Facility: CLINIC | Age: 43
End: 2023-11-06
Payer: COMMERCIAL

## 2023-11-06 VITALS — HEIGHT: 64 IN | BODY MASS INDEX: 21.68 KG/M2 | OXYGEN SATURATION: 98 % | WEIGHT: 127 LBS | HEART RATE: 72 BPM

## 2023-11-06 DIAGNOSIS — Z78.9 OTHER SPECIFIED HEALTH STATUS: ICD-10-CM

## 2023-11-06 PROCEDURE — 99204 OFFICE O/P NEW MOD 45 MIN: CPT

## 2023-11-06 RX ORDER — BENZONATATE 150 MG/1
CAPSULE ORAL
Refills: 0 | Status: ACTIVE | COMMUNITY

## 2023-11-27 ENCOUNTER — APPOINTMENT (OUTPATIENT)
Dept: BREAST CENTER | Facility: CLINIC | Age: 43
End: 2023-11-27
Payer: COMMERCIAL

## 2023-11-27 ENCOUNTER — NON-APPOINTMENT (OUTPATIENT)
Age: 43
End: 2023-11-27

## 2023-11-27 VITALS
HEIGHT: 64 IN | WEIGHT: 127 LBS | HEART RATE: 79 BPM | BODY MASS INDEX: 21.68 KG/M2 | DIASTOLIC BLOOD PRESSURE: 71 MMHG | SYSTOLIC BLOOD PRESSURE: 108 MMHG

## 2023-11-27 DIAGNOSIS — Z80.3 FAMILY HISTORY OF MALIGNANT NEOPLASM OF BREAST: ICD-10-CM

## 2023-11-27 PROCEDURE — 99215 OFFICE O/P EST HI 40 MIN: CPT | Mod: 24

## 2023-11-29 ENCOUNTER — APPOINTMENT (OUTPATIENT)
Dept: PLASTIC SURGERY | Facility: CLINIC | Age: 43
End: 2023-11-29

## 2023-12-04 ENCOUNTER — APPOINTMENT (OUTPATIENT)
Dept: PLASTIC SURGERY | Facility: CLINIC | Age: 43
End: 2023-12-04
Payer: COMMERCIAL

## 2023-12-04 VITALS — HEART RATE: 75 BPM | WEIGHT: 125 LBS | HEIGHT: 64 IN | BODY MASS INDEX: 21.34 KG/M2 | OXYGEN SATURATION: 100 %

## 2023-12-04 PROCEDURE — 99214 OFFICE O/P EST MOD 30 MIN: CPT

## 2023-12-05 ENCOUNTER — APPOINTMENT (OUTPATIENT)
Dept: PLASTIC SURGERY | Facility: CLINIC | Age: 43
End: 2023-12-05
Payer: COMMERCIAL

## 2023-12-05 PROCEDURE — 99214 OFFICE O/P EST MOD 30 MIN: CPT | Mod: 95

## 2023-12-06 ENCOUNTER — OUTPATIENT (OUTPATIENT)
Dept: OUTPATIENT SERVICES | Facility: HOSPITAL | Age: 43
LOS: 1 days | End: 2023-12-06

## 2023-12-06 ENCOUNTER — APPOINTMENT (OUTPATIENT)
Dept: CT IMAGING | Facility: CLINIC | Age: 43
End: 2023-12-06
Payer: COMMERCIAL

## 2023-12-06 PROCEDURE — 74174 CTA ABD&PLVS W/CONTRAST: CPT | Mod: 26

## 2023-12-12 ENCOUNTER — NON-APPOINTMENT (OUTPATIENT)
Age: 43
End: 2023-12-12

## 2023-12-14 ENCOUNTER — TRANSCRIPTION ENCOUNTER (OUTPATIENT)
Age: 43
End: 2023-12-14

## 2023-12-14 VITALS
RESPIRATION RATE: 16 BRPM | DIASTOLIC BLOOD PRESSURE: 59 MMHG | HEIGHT: 64 IN | OXYGEN SATURATION: 98 % | TEMPERATURE: 97 F | HEART RATE: 75 BPM | WEIGHT: 119.05 LBS | SYSTOLIC BLOOD PRESSURE: 94 MMHG

## 2023-12-14 RX ORDER — INFLUENZA VIRUS VACCINE 15; 15; 15; 15 UG/.5ML; UG/.5ML; UG/.5ML; UG/.5ML
0.5 SUSPENSION INTRAMUSCULAR ONCE
Refills: 0 | Status: DISCONTINUED | OUTPATIENT
Start: 2023-12-16 | End: 2023-12-17

## 2023-12-14 NOTE — PRE-OP CHECKLIST - 3.
as per anesthesiologist he prefers to insert IV inside the OR himself as per anesthesiologist Dr Dillard ,  he prefers to insert IV inside the OR himself

## 2023-12-14 NOTE — PRE-OP CHECKLIST - 2.
Jose Madison [facility]  Jose Madison [facility]

## 2023-12-14 NOTE — PATIENT PROFILE ADULT - FALL HARM RISK - UNIVERSAL INTERVENTIONS
Bed in lowest position, wheels locked, appropriate side rails in place/Call bell, personal items and telephone in reach/Instruct patient to call for assistance before getting out of bed or chair/Non-slip footwear when patient is out of bed/[place] to call system/Physically safe environment - no spills, clutter or unnecessary equipment/Purposeful Proactive Rounding/Room/bathroom lighting operational, light cord in reach Bed in lowest position, wheels locked, appropriate side rails in place/Call bell, personal items and telephone in reach/Instruct patient to call for assistance before getting out of bed or chair/Non-slip footwear when patient is out of bed/[place] to call system/Physically safe environment - no spills, clutter or unnecessary equipment/Purposeful Proactive Rounding/Room/bathroom lighting operational, light cord in reach

## 2023-12-15 ENCOUNTER — TRANSCRIPTION ENCOUNTER (OUTPATIENT)
Age: 43
End: 2023-12-15

## 2023-12-15 ENCOUNTER — APPOINTMENT (OUTPATIENT)
Dept: BREAST CENTER | Facility: HOSPITAL | Age: 43
End: 2023-12-15

## 2023-12-15 ENCOUNTER — RESULT REVIEW (OUTPATIENT)
Age: 43
End: 2023-12-15

## 2023-12-15 ENCOUNTER — INPATIENT (INPATIENT)
Facility: HOSPITAL | Age: 43
LOS: 1 days | Discharge: HOME | DRG: 581 | End: 2023-12-17
Attending: PLASTIC SURGERY | Admitting: PLASTIC SURGERY
Payer: COMMERCIAL

## 2023-12-15 DIAGNOSIS — Z98.890 OTHER SPECIFIED POSTPROCEDURAL STATES: Chronic | ICD-10-CM

## 2023-12-15 DIAGNOSIS — D05.92 UNSPECIFIED TYPE OF CARCINOMA IN SITU OF LEFT BREAST: ICD-10-CM

## 2023-12-15 DIAGNOSIS — C50.919 MALIGNANT NEOPLASM OF UNSPECIFIED SITE OF UNSPECIFIED FEMALE BREAST: ICD-10-CM

## 2023-12-15 DIAGNOSIS — Z90.49 ACQUIRED ABSENCE OF OTHER SPECIFIED PARTS OF DIGESTIVE TRACT: Chronic | ICD-10-CM

## 2023-12-15 DIAGNOSIS — Z98.891 HISTORY OF UTERINE SCAR FROM PREVIOUS SURGERY: Chronic | ICD-10-CM

## 2023-12-15 DIAGNOSIS — Z91.041 RADIOGRAPHIC DYE ALLERGY STATUS: ICD-10-CM

## 2023-12-15 PROCEDURE — 88300 SURGICAL PATH GROSS: CPT | Mod: 26,59

## 2023-12-15 PROCEDURE — 88309 TISSUE EXAM BY PATHOLOGIST: CPT | Mod: 26

## 2023-12-15 PROCEDURE — 38530 BIOPSY/REMOVAL LYMPH NODES: CPT | Mod: LT

## 2023-12-15 PROCEDURE — 19303 MAST SIMPLE COMPLETE: CPT | Mod: LT,58

## 2023-12-15 PROCEDURE — 15777 ACELLULAR DERM MATRIX IMPLT: CPT | Mod: 50

## 2023-12-15 PROCEDURE — 88305 TISSUE EXAM BY PATHOLOGIST: CPT | Mod: 26

## 2023-12-15 PROCEDURE — 21600 PARTIAL REMOVAL OF RIB: CPT | Mod: LT,59

## 2023-12-15 PROCEDURE — 38530 BIOPSY/REMOVAL LYMPH NODES: CPT | Mod: 82,LT

## 2023-12-15 PROCEDURE — S2067: CPT | Mod: LT

## 2023-12-15 PROCEDURE — 88304 TISSUE EXAM BY PATHOLOGIST: CPT | Mod: 26

## 2023-12-15 PROCEDURE — 21600 PARTIAL REMOVAL OF RIB: CPT | Mod: 82,LT,59

## 2023-12-15 PROCEDURE — 64912 NRV RPR W/NRV ALGRFT 1ST: CPT | Mod: LT

## 2023-12-15 PROCEDURE — 76098 X-RAY EXAM SURGICAL SPECIMEN: CPT | Mod: 26,58

## 2023-12-15 PROCEDURE — S2067: CPT | Mod: 82,LT

## 2023-12-15 PROCEDURE — 64912 NRV RPR W/NRV ALGRFT 1ST: CPT | Mod: 82,LT

## 2023-12-15 DEVICE — CLIP APPLIER ETHICON LIGACLIP 9 3/8" SMALL: Type: IMPLANTABLE DEVICE | Status: FUNCTIONAL

## 2023-12-15 DEVICE — DOPPLER PROBE DISPOSABLE: Type: IMPLANTABLE DEVICE | Status: FUNCTIONAL

## 2023-12-15 DEVICE — CLIP APPLIER ETHICON LIGACLIP 11.5" MEDIUM: Type: IMPLANTABLE DEVICE | Status: FUNCTIONAL

## 2023-12-15 DEVICE — MESH PHASIX 3X8IN: Type: IMPLANTABLE DEVICE | Status: FUNCTIONAL

## 2023-12-15 DEVICE — GRAFT NERVE CONNECTOR 2X10MM: Type: IMPLANTABLE DEVICE | Status: FUNCTIONAL

## 2023-12-15 DEVICE — LIGATING CLIPS SYNOVIS SUPERFINE MICROCLIP 6: Type: IMPLANTABLE DEVICE | Status: FUNCTIONAL

## 2023-12-15 DEVICE — CARTRIDGE MICROCLIP 30: Type: IMPLANTABLE DEVICE | Status: FUNCTIONAL

## 2023-12-15 DEVICE — COUPLER VESSEL MICROVASC ANAST 2MM GRN: Type: IMPLANTABLE DEVICE | Status: FUNCTIONAL

## 2023-12-15 RX ORDER — BUPIVACAINE 13.3 MG/ML
20 INJECTION, SUSPENSION, LIPOSOMAL INFILTRATION ONCE
Refills: 0 | Status: DISCONTINUED | OUTPATIENT
Start: 2023-12-15 | End: 2023-12-15

## 2023-12-15 RX ORDER — SODIUM CHLORIDE 9 MG/ML
1000 INJECTION, SOLUTION INTRAVENOUS
Refills: 0 | Status: DISCONTINUED | OUTPATIENT
Start: 2023-12-15 | End: 2023-12-17

## 2023-12-15 RX ORDER — ENOXAPARIN SODIUM 100 MG/ML
40 INJECTION SUBCUTANEOUS ONCE
Refills: 0 | Status: COMPLETED | OUTPATIENT
Start: 2023-12-15 | End: 2023-12-15

## 2023-12-15 RX ORDER — KETOROLAC TROMETHAMINE 30 MG/ML
15 SYRINGE (ML) INJECTION EVERY 6 HOURS
Refills: 0 | Status: DISCONTINUED | OUTPATIENT
Start: 2023-12-15 | End: 2023-12-17

## 2023-12-15 RX ORDER — DIAZEPAM 5 MG
5 TABLET ORAL EVERY 8 HOURS
Refills: 0 | Status: DISCONTINUED | OUTPATIENT
Start: 2023-12-15 | End: 2023-12-17

## 2023-12-15 RX ORDER — ACETAMINOPHEN 500 MG
1000 TABLET ORAL EVERY 8 HOURS
Refills: 0 | Status: COMPLETED | OUTPATIENT
Start: 2023-12-15 | End: 2023-12-16

## 2023-12-15 RX ORDER — HYDROMORPHONE HYDROCHLORIDE 2 MG/ML
0.5 INJECTION INTRAMUSCULAR; INTRAVENOUS; SUBCUTANEOUS EVERY 4 HOURS
Refills: 0 | Status: DISCONTINUED | OUTPATIENT
Start: 2023-12-15 | End: 2023-12-17

## 2023-12-15 RX ORDER — APREPITANT 80 MG/1
40 CAPSULE ORAL ONCE
Refills: 0 | Status: COMPLETED | OUTPATIENT
Start: 2023-12-15 | End: 2023-12-15

## 2023-12-15 RX ORDER — SODIUM CHLORIDE 9 MG/ML
1000 INJECTION, SOLUTION INTRAVENOUS ONCE
Refills: 0 | Status: COMPLETED | OUTPATIENT
Start: 2023-12-15 | End: 2023-12-15

## 2023-12-15 RX ORDER — GABAPENTIN 400 MG/1
300 CAPSULE ORAL ONCE
Refills: 0 | Status: COMPLETED | OUTPATIENT
Start: 2023-12-15 | End: 2023-12-15

## 2023-12-15 RX ORDER — CELECOXIB 200 MG/1
400 CAPSULE ORAL ONCE
Refills: 0 | Status: COMPLETED | OUTPATIENT
Start: 2023-12-15 | End: 2023-12-15

## 2023-12-15 RX ORDER — CEFAZOLIN SODIUM 1 G
2000 VIAL (EA) INJECTION EVERY 8 HOURS
Refills: 0 | Status: COMPLETED | OUTPATIENT
Start: 2023-12-15 | End: 2023-12-16

## 2023-12-15 RX ORDER — ACETAMINOPHEN 500 MG
975 TABLET ORAL ONCE
Refills: 0 | Status: COMPLETED | OUTPATIENT
Start: 2023-12-15 | End: 2023-12-15

## 2023-12-15 RX ORDER — OXYCODONE HYDROCHLORIDE 5 MG/1
5 TABLET ORAL EVERY 4 HOURS
Refills: 0 | Status: DISCONTINUED | OUTPATIENT
Start: 2023-12-15 | End: 2023-12-17

## 2023-12-15 RX ADMIN — Medication 15 MILLIGRAM(S): at 18:45

## 2023-12-15 RX ADMIN — Medication 15 MILLIGRAM(S): at 19:00

## 2023-12-15 RX ADMIN — GABAPENTIN 300 MILLIGRAM(S): 400 CAPSULE ORAL at 07:24

## 2023-12-15 RX ADMIN — CELECOXIB 400 MILLIGRAM(S): 200 CAPSULE ORAL at 07:24

## 2023-12-15 RX ADMIN — HYDROMORPHONE HYDROCHLORIDE 0.5 MILLIGRAM(S): 2 INJECTION INTRAMUSCULAR; INTRAVENOUS; SUBCUTANEOUS at 16:36

## 2023-12-15 RX ADMIN — Medication 100 MILLIGRAM(S): at 22:08

## 2023-12-15 RX ADMIN — Medication 400 MILLIGRAM(S): at 21:15

## 2023-12-15 RX ADMIN — SODIUM CHLORIDE 1000 MILLILITER(S): 9 INJECTION, SOLUTION INTRAVENOUS at 18:48

## 2023-12-15 RX ADMIN — Medication 975 MILLIGRAM(S): at 07:24

## 2023-12-15 RX ADMIN — Medication 1000 MILLIGRAM(S): at 21:45

## 2023-12-15 RX ADMIN — ENOXAPARIN SODIUM 40 MILLIGRAM(S): 100 INJECTION SUBCUTANEOUS at 07:24

## 2023-12-15 RX ADMIN — SODIUM CHLORIDE 125 MILLILITER(S): 9 INJECTION, SOLUTION INTRAVENOUS at 22:08

## 2023-12-15 RX ADMIN — APREPITANT 40 MILLIGRAM(S): 80 CAPSULE ORAL at 07:24

## 2023-12-15 RX ADMIN — SODIUM CHLORIDE 125 MILLILITER(S): 9 INJECTION, SOLUTION INTRAVENOUS at 18:44

## 2023-12-15 NOTE — DISCHARGE NOTE PROVIDER - CARE PROVIDER_API CALL
Jackson Zhao  Plastic Surgery  [address], [address]  [place] [address]  Phone: [phone]  Fax: [phone]  Follow Up Time:    Jackson Zhao  Plastic Surgery  [address], [address]  [place] [address]  Phone: [phone]  Fax: [phone]  Follow Up Time:

## 2023-12-15 NOTE — DISCHARGE NOTE PROVIDER - NSDCMRMEDTOKEN_GEN_ALL_CORE_FT
diazePAM 5 mg oral tablet: 1 tab(s) orally every 8 hours As needed Anxiety  oxyCODONE 5 mg oral tablet: 1 tab(s) orally every 4 hours as needed for  severe pain MDD: 30

## 2023-12-15 NOTE — PRE-ANESTHESIA EVALUATION ADULT - NSANTHOSAYNRD_GEN_A_CORE
No. RIVKA screening performed.  STOP BANG Legend: 0-2 = LOW Risk; 3-4 = INTERMEDIATE Risk; 5-8 = HIGH Risk

## 2023-12-15 NOTE — DISCHARGE NOTE PROVIDER - NSDCFUSCHEDAPPT_GEN_ALL_CORE_FT
Jackson Zhao  [facility]  PLASTICSUR [address]  Scheduled Appointment: 12/27/2023    [facility]  BREAST 5 [facility]  Scheduled Appointment: 12/28/2023     Jackson Zhao  [facility] Physician [facility]  [address]AST[address]  Scheduled Appointment: 12/27/2023    [facility]  BREAST 5 [facility]  Scheduled Appointment: 12/28/2023

## 2023-12-15 NOTE — DISCHARGE NOTE PROVIDER - NSDCFUADDINST_GEN_ALL_CORE_FT
*Please refer to the post-operative care instructions provided from Dr. Zhao’s office.     -Follow up with Plastic & Reconstructive Surgeon, Dr. Zhao in 1 week in the office.   -Follow up with Breast Surgeon,  ____ in 1-2 weeks in the office.    -Continue ASPEN drain care as instructed. (Empty and record the ASPEN drainage twice daily after discharge. Also, strip/milk the drain tubing each time to minimize clogging. Bring the recorded drain amounts to the office so that it can be reviewed by the physician.)     -Take Percocet as prescribed for pain control.     -Diet: no restrictions.     -Showers are permitted the day of discharge. The drains and the incision lines can get wet in the shower. Do not take a bath. Pin the drains to a bathrobe belt or string or a small towel draped over your neck in order that the drains do not dangle from your skin while in the shower. Keep incision sites and ASPEN drain sites clean & dry after showering.     -No heavy lifting >20 pounds or strenuous exercises.       -Call Doctor’s office or return to ER if: fever (temperature >101.4F), chills, chest pain, shortness of breath, uncontrolled/severe pain, persistent nausea/vomiting, or bleeding/oozing/redness/swelling at incision sites.    -For routine questions, call the office ([phone]) weekdays 9:00 A.M. - 5:00 P.M.  For emergencies after business hours, call any time using this same office phone number and the answering service will put you in touch with Dr. Zhao.  *Please refer to the post-operative care instructions provided from Dr. Zhao’s office.     -Follow up with Plastic & Reconstructive Surgeon, Dr. Zhao in 1 week in the office.   -Follow up with Breast Surgeon,  ____ in 1-2 weeks in the office.    -Continue ASPEN drain care as instructed. (Empty and record the ASPEN drainage twice daily after discharge. Also, strip/milk the drain tubing each time to minimize clogging. Bring the recorded drain amounts to the office so that it can be reviewed by the physician.)     -Take Percocet as prescribed for pain control.     -Diet: no restrictions.     -Showers are permitted the day of discharge. The drains and the incision lines can get wet in the shower. Do not take a bath. Pin the drains to a bathrobe belt or string or a small towel draped over your neck in order that the drains do not dangle from your skin while in the shower. Keep incision sites and ASPEN drain sites clean & dry after showering.     -No heavy lifting >20 pounds or strenuous exercises.       -Call Doctor’s office or return to ER if: fever (temperature >101.4F), chills, chest pain, shortness of breath, uncontrolled/severe pain, persistent nausea/vomiting, or bleeding/oozing/redness/swelling at incision sites.    -For routine questions, call the office ([phone]) weekdays 9:00 A.M. - 5:00 P.M.  For emergencies after business hours, call any time using this same office phone number and the answering service will put you in touch with Dr. Zhao.

## 2023-12-15 NOTE — DISCHARGE NOTE PROVIDER - CARE PROVIDERS DIRECT ADDRESSES
,[email or web address]

## 2023-12-15 NOTE — BRIEF OPERATIVE NOTE - OPERATION/FINDINGS
Patient was prepped and draped in sterile fashion. Incision was made at the inframammary cleft. Breast tissue was dissected and excised off the pectoral fascia, sparing overlying skin and nipple. Cavity was irrigated and adequate hemostasis achieved. This concluded the general surgery portion of the operation.

## 2023-12-15 NOTE — DISCHARGE NOTE PROVIDER - HOSPITAL COURSE
Ms Madison presented to [facility]  to undergo a bilateral mastectomy with and bilateral breast reconstruction with YOSSI flaps and implants in the OR. The patient tolerated the procedure well. Postoperatively the patient was sent to the PACU and then to a surgical floor. The patient's flaps were monitored by doppler and by clinical examination. On POD 1, the patient was hemodynamically stable;  was advanced to a regular diet; was placed on her home medications; was out of bed to a chair, and had her knight removed. On POD 2,  the patient ambulated. During the patient's hospital course, the patient's pain was controlled by IV pain medications and then by PO pain medications.    At the time of discharge, the patient was hemodynamically stable, was tolerating PO diet, was voiding urine, was ambulating, and was comfortable with adequate pain control. The patient was instructed to follow up with Dr. Zhao within x1 week(s) after discharge from the hospital and Dr. Arnold within x1 week(s) after discharge from the hospital. The patient/family felt comfortable with discharge. The patient received prescriptions for oral pain medication and oral antibiotics preoperatively from the attending surgeon. The patient had no other issues. Ms Madison presented to [facility]  to undergo a bilateral mastectomy with and bilateral breast reconstruction with YOSSI flaps and implants in the OR. The patient tolerated the procedure well. Postoperatively the patient was sent to the PACU and then to a surgical floor. The patient's flaps were monitored by doppler and by clinical examination. On POD 1, the patient was hemodynamically stable;  was advanced to a regular diet; was placed on her home medications; was out of bed to a chair, and had her knight removed. On POD 2,  the patient ambulated. During the patient's hospital course, the patient's pain was controlled by IV pain medications and then by PO pain medications.    At the time of discharge, the patient was hemodynamically stable, was tolerating PO diet, was voiding urine, was ambulating, and was comfortable with adequate pain control. The patient was instructed to follow up with Dr. Zhao within x1 week(s) after discharge from the hospital and Dr. Arnold within x1 week(s) after discharge from the hospital. The patient/family felt comfortable with discharge. The patient received prescriptions for oral pain medication and oral antibiotics preoperatively from the attending surgeon. The patient had no other issues.

## 2023-12-16 LAB
HCT VFR BLD CALC: 31.7 % — LOW (ref 34.5–45)
HCT VFR BLD CALC: 31.7 % — LOW (ref 34.5–45)
HGB BLD-MCNC: 10.5 G/DL — LOW (ref 11.5–15.5)
HGB BLD-MCNC: 10.5 G/DL — LOW (ref 11.5–15.5)
MCHC RBC-ENTMCNC: 31.3 PG — SIGNIFICANT CHANGE UP (ref 27–34)
MCHC RBC-ENTMCNC: 31.3 PG — SIGNIFICANT CHANGE UP (ref 27–34)
MCHC RBC-ENTMCNC: 33.1 GM/DL — SIGNIFICANT CHANGE UP (ref 32–36)
MCHC RBC-ENTMCNC: 33.1 GM/DL — SIGNIFICANT CHANGE UP (ref 32–36)
MCV RBC AUTO: 94.6 FL — SIGNIFICANT CHANGE UP (ref 80–100)
MCV RBC AUTO: 94.6 FL — SIGNIFICANT CHANGE UP (ref 80–100)
NRBC # BLD: 0 /100 WBCS — SIGNIFICANT CHANGE UP (ref 0–0)
NRBC # BLD: 0 /100 WBCS — SIGNIFICANT CHANGE UP (ref 0–0)
PLATELET # BLD AUTO: 150 K/UL — SIGNIFICANT CHANGE UP (ref 150–400)
PLATELET # BLD AUTO: 150 K/UL — SIGNIFICANT CHANGE UP (ref 150–400)
RBC # BLD: 3.35 M/UL — LOW (ref 3.8–5.2)
RBC # BLD: 3.35 M/UL — LOW (ref 3.8–5.2)
RBC # FLD: 12.3 % — SIGNIFICANT CHANGE UP (ref 10.3–14.5)
RBC # FLD: 12.3 % — SIGNIFICANT CHANGE UP (ref 10.3–14.5)
WBC # BLD: 10.09 K/UL — SIGNIFICANT CHANGE UP (ref 3.8–10.5)
WBC # BLD: 10.09 K/UL — SIGNIFICANT CHANGE UP (ref 3.8–10.5)
WBC # FLD AUTO: 10.09 K/UL — SIGNIFICANT CHANGE UP (ref 3.8–10.5)
WBC # FLD AUTO: 10.09 K/UL — SIGNIFICANT CHANGE UP (ref 3.8–10.5)

## 2023-12-16 RX ADMIN — Medication 15 MILLIGRAM(S): at 17:31

## 2023-12-16 RX ADMIN — OXYCODONE HYDROCHLORIDE 5 MILLIGRAM(S): 5 TABLET ORAL at 16:42

## 2023-12-16 RX ADMIN — OXYCODONE HYDROCHLORIDE 5 MILLIGRAM(S): 5 TABLET ORAL at 17:41

## 2023-12-16 RX ADMIN — Medication 400 MILLIGRAM(S): at 06:30

## 2023-12-16 RX ADMIN — OXYCODONE HYDROCHLORIDE 5 MILLIGRAM(S): 5 TABLET ORAL at 10:48

## 2023-12-16 RX ADMIN — Medication 15 MILLIGRAM(S): at 11:51

## 2023-12-16 RX ADMIN — OXYCODONE HYDROCHLORIDE 5 MILLIGRAM(S): 5 TABLET ORAL at 04:00

## 2023-12-16 RX ADMIN — Medication 15 MILLIGRAM(S): at 00:13

## 2023-12-16 RX ADMIN — Medication 100 MILLIGRAM(S): at 05:45

## 2023-12-16 RX ADMIN — Medication 5 MILLIGRAM(S): at 21:27

## 2023-12-16 RX ADMIN — OXYCODONE HYDROCHLORIDE 5 MILLIGRAM(S): 5 TABLET ORAL at 04:30

## 2023-12-16 RX ADMIN — Medication 400 MILLIGRAM(S): at 21:27

## 2023-12-16 RX ADMIN — Medication 15 MILLIGRAM(S): at 05:45

## 2023-12-16 RX ADMIN — Medication 400 MILLIGRAM(S): at 13:51

## 2023-12-16 RX ADMIN — OXYCODONE HYDROCHLORIDE 5 MILLIGRAM(S): 5 TABLET ORAL at 11:50

## 2023-12-16 RX ADMIN — Medication 15 MILLIGRAM(S): at 12:20

## 2023-12-16 NOTE — PROGRESS NOTE ADULT - SUBJECTIVE AND OBJECTIVE BOX
Mary is POD1 from stacked L mastectomy and stacked YOSSI flap reconstruction. She had low UOP initially in PACU at 10/h which responded to 1L bolus and then subsequent mIVF with excellent UOP overnight. She has no complaints, pain well controlled. No labs. Drains 25/5/5    Exam: L breast s/p nipple sparing mastectomy and flap reconstruction. [place] doppler signal strong. ASPEN HUANG  Abdomen: Soft and flat, incisions c/d/i, umbilicus viable, ASPEN HUANG    Plan:   -OOB ambulate  -Dc knight  -regular diet  -q2h flap checks   -Continue current pain regimen  -Planned discharge tomorrow Mary is POD1 from stacked L mastectomy and stacked YOSSI flap reconstruction. She had low UOP initially in PACU at 10/h which responded to 1L bolus and then subsequent mIVF with excellent UOP overnight. She has no complaints, pain well controlled. No labs. Drains 25/5/5    Exam: L breast s/p nipple sparing mastectomy and flap reconstruction. [place] doppler signal strong. ASPEN HUANG  Abdomen: Soft and flat, incisions c/d/i, umbilicus viable, ASPEN HUANG    Plan:   -OOB ambulate  -Dc knight  -regular diet  -q2h flap checks   -Continue current pain regimen  -Planned discharge tomorrow

## 2023-12-16 NOTE — PROVIDER CONTACT NOTE (OTHER) - ASSESSMENT
The pt is alert and oriented, denies headache, SOB, chest pain or nausea. She is getting fluids at 125 ml/hr and has sufficient knight output at this time. Her BP at start of shift was 103/60. At 00:24 the BP was 91/55, 70 HR.
low knight output 10 cc urine output for the hour

## 2023-12-16 NOTE — PROVIDER CONTACT NOTE (OTHER) - ACTION/TREATMENT ORDERED:
There are no interventions needed at this time per Dr. Painter. The team will be notified if there are any changes in vital signs or pt status. Care ongoing.
As per MD, LR increased to 125 mL/hr

## 2023-12-17 ENCOUNTER — TRANSCRIPTION ENCOUNTER (OUTPATIENT)
Age: 43
End: 2023-12-17

## 2023-12-17 VITALS
OXYGEN SATURATION: 98 % | TEMPERATURE: 99 F | RESPIRATION RATE: 17 BRPM | SYSTOLIC BLOOD PRESSURE: 100 MMHG | DIASTOLIC BLOOD PRESSURE: 77 MMHG | HEART RATE: 86 BPM

## 2023-12-17 PROCEDURE — C1762: CPT

## 2023-12-17 PROCEDURE — C1889: CPT

## 2023-12-17 PROCEDURE — 36415 COLL VENOUS BLD VENIPUNCTURE: CPT

## 2023-12-17 PROCEDURE — 85027 COMPLETE CBC AUTOMATED: CPT

## 2023-12-17 PROCEDURE — 88300 SURGICAL PATH GROSS: CPT

## 2023-12-17 PROCEDURE — C1781: CPT

## 2023-12-17 PROCEDURE — 88309 TISSUE EXAM BY PATHOLOGIST: CPT

## 2023-12-17 PROCEDURE — 88304 TISSUE EXAM BY PATHOLOGIST: CPT

## 2023-12-17 PROCEDURE — 88305 TISSUE EXAM BY PATHOLOGIST: CPT

## 2023-12-17 PROCEDURE — C1763: CPT

## 2023-12-17 RX ORDER — OXYCODONE HYDROCHLORIDE 5 MG/1
1 TABLET ORAL
Qty: 12 | Refills: 0
Start: 2023-12-17

## 2023-12-17 RX ORDER — DIAZEPAM 5 MG
1 TABLET ORAL
Qty: 0 | Refills: 0 | DISCHARGE
Start: 2023-12-17

## 2023-12-17 RX ORDER — OXYCODONE HYDROCHLORIDE 5 MG/1
5 TABLET ORAL EVERY 4 HOURS
Refills: 0 | Status: DISCONTINUED | OUTPATIENT
Start: 2023-12-17 | End: 2023-12-17

## 2023-12-17 RX ADMIN — Medication 15 MILLIGRAM(S): at 08:07

## 2023-12-17 RX ADMIN — OXYCODONE HYDROCHLORIDE 5 MILLIGRAM(S): 5 TABLET ORAL at 04:55

## 2023-12-17 RX ADMIN — OXYCODONE HYDROCHLORIDE 5 MILLIGRAM(S): 5 TABLET ORAL at 05:55

## 2023-12-17 RX ADMIN — OXYCODONE HYDROCHLORIDE 5 MILLIGRAM(S): 5 TABLET ORAL at 11:05

## 2023-12-17 RX ADMIN — Medication 15 MILLIGRAM(S): at 00:18

## 2023-12-17 NOTE — DISCHARGE NOTE NURSING/CASE MANAGEMENT/SOCIAL WORK - NSDCPEFALRISK_GEN_ALL_CORE
For information on Fall & Injury Prevention, visit: [email or web address] OR  [email or web address] OR  https://www.cdc.gov/steadi/patient.html For information on Fall & Injury Prevention, visit: [email or web address] OR  [email or web address] OR  https://www.cdc.gov/steadi/patient.html

## 2023-12-17 NOTE — DISCHARGE NOTE NURSING/CASE MANAGEMENT/SOCIAL WORK - NSDPLANG ASIS_GEN_ALL_CORE
Consent: The patient's and patient's parent consent was obtained including but not limited to risks of crusting, scabbing, blistering, scarring, darker or lighter pigmentary change, recurrence, incomplete removal and infection. The patient will notify clinic if lesions do not resolve with this treatment. Include Z78.9 (Other Specified Conditions Influencing Health Status) As An Associated Diagnosis?: No Detail Level: Simple Post-Care Instructions: Pt was given [facility] handout on post liquid nitrogen care. Medical Necessity Clause: This procedure was medically necessary because the lesions that were treated were: Total Number Of Lesions Treated: 2 Medical Necessity Information: It is in your best interest to select a reason for this procedure from the list below. All of these items fulfill various CMS LCD requirements except the new and changing color options. Duration Of Freeze Thaw-Cycle (Seconds): 0 No

## 2023-12-17 NOTE — DISCHARGE NOTE NURSING/CASE MANAGEMENT/SOCIAL WORK - PATIENT PORTAL LINK FT
You can access the FollowMyHealth Patient Portal offered by [facility] by registering at the following website: [email or web address]. By joining [employer]’s FollowMyHealth portal, you will also be able to view your health information using other applications (apps) compatible with our system. You can access the FollowMyHealth Patient Portal offered by [facility] by registering at the following website: [email or web address]. By joining [email or web address]’s FollowMyHealth portal, you will also be able to view your health information using other applications (apps) compatible with our system.

## 2023-12-18 ENCOUNTER — NON-APPOINTMENT (OUTPATIENT)
Age: 43
End: 2023-12-18

## 2023-12-26 LAB
SURGICAL PATHOLOGY STUDY: SIGNIFICANT CHANGE UP
SURGICAL PATHOLOGY STUDY: SIGNIFICANT CHANGE UP

## 2023-12-27 ENCOUNTER — APPOINTMENT (OUTPATIENT)
Dept: PLASTIC SURGERY | Facility: CLINIC | Age: 43
End: 2023-12-27
Payer: COMMERCIAL

## 2023-12-27 PROCEDURE — 99024 POSTOP FOLLOW-UP VISIT: CPT

## 2023-12-27 NOTE — HISTORY OF PRESENT ILLNESS
[FreeTextEntry1] : 42 y/o female presents 12 days s/p left YOSSI flap breast reconstruction on 12/15/23.  Flaps healthy. All incisions intact. No collections, no infections. All drains removed today. Follow up in 2 weeks to reassess healing.

## 2023-12-28 ENCOUNTER — APPOINTMENT (OUTPATIENT)
Dept: HEMATOLOGY ONCOLOGY | Facility: CLINIC | Age: 43
End: 2023-12-28
Payer: COMMERCIAL

## 2023-12-28 ENCOUNTER — APPOINTMENT (OUTPATIENT)
Dept: BREAST CENTER | Facility: CLINIC | Age: 43
End: 2023-12-28
Payer: COMMERCIAL

## 2023-12-28 VITALS
SYSTOLIC BLOOD PRESSURE: 112 MMHG | HEART RATE: 73 BPM | BODY MASS INDEX: 21.34 KG/M2 | DIASTOLIC BLOOD PRESSURE: 71 MMHG | HEIGHT: 64 IN | WEIGHT: 125 LBS

## 2023-12-28 PROCEDURE — 99203 OFFICE O/P NEW LOW 30 MIN: CPT

## 2023-12-28 PROCEDURE — 99024 POSTOP FOLLOW-UP VISIT: CPT

## 2023-12-28 NOTE — PHYSICAL EXAM
[Normal] : affect appropriate [de-identified] : s/p left mastectomy and reconstruction  [de-identified] : tenderness with palpation of suprapubic area due to recent plastic surgery

## 2023-12-28 NOTE — HISTORY OF PRESENT ILLNESS
[FreeTextEntry1] : Patient is a 44 yo F who presents today for initial evaluation. S/p L nloc excisional bx x2 for ADH/FEA and terminal duct excision for bloody nipple discharge on 10/17/23 (Age 43). 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 SITE 2) 3:00 4FN- 1.2cm DCIS, ADH/FEA, <1mm medial margin (which abuts lateral margin of site 1), <1mm posterior margin, ER+ (>99%), AR+ (>99%). SITE 3) Terminal duct- fibrocystic changes. Patient met with  Dec 11/6/23 to discuss possible L TM vs re-excision w/ tissue arrangement. Family history of breast cancer in mother (age 73, BRCA/panel negative tested 2023). Patient is BRCA1/2/full panel negative (8/2023). Patient denies palpable masses or skin changes.  TNM Staging- pTis, Nx, Mx  6/22/23: B/l MG/US (L nipple discharge)- extremely dense, L lateral scattered groups of layering and punctate calcs (rec 6M f/u L dxMG), US-multiple subcm cysts b/l (rec 6 M f/u b/l US), L 9:00 0FN 0.4 cm subdermal oval partially indistinct hypoechogenic shadowing mass (rec L US bx). BIRADS 4. 6/28/23: L US bx- L 9:00 0.4 cm hypoechoic mass (S clip)- Benign PASH and usual epithelial hyperplasia. Benign & Concordant. 8/16/23: MRI- heterogeneously dense, AZRA. No axillary or internal mammary adenopathy, L 9:00 tissue marker clip not assoc w/ suspicious enhancement. (Given L indeterminate calcs on MG and L pathologic nipple discharge rec L dxMG and L stereo bx). BIRADS 4. 8/23/23: L stereo bx x 2- SITE 1) L 2:30 posterior depth micro calcs (cork clip)- Focal ADH in a background of FEA with assoc calcs and fibrocystic changes. High Risk & Concordant. Rec surgical consult. SITE 2) L 3:00 anterior aspect of micro calcs (dumbbell clip)- Focal ADH in background of FEA w/ assoc calcs, fibrocystic changes. High Risk & Concordant. Rec surgical consult. **Of note, there are additional microcalcs anterior to the anterior clip. The anterior clip (dumbbell) is adjacent to a prominent cluster, these were sampled, the clip is at the edge of this cluster. It should be excised. There are additional microcalcs anterior to the anterior cluster. If there is no malignancy at surgery, then they do not need to be excised.  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 SITE 2) 3:00 4FN- 1.2cm DCIS (cribriform/flat types w/ intermediate nuclear grade), ADH/FEA, <1mm medial margin (which abuts lateral margin of site 1), <1mm posterior margin, ER+ (>99%), AR+ (>99%). SITE 3) Terminal duct- fibrocystic changes.  11/27/23: L MG- heterogeneously dense. L postsurgical changes central outer breast. L 1.3cm cluster of amorphous calcs central outer 5FN at posterior aspect of scar. L 0.4cm cluster of amorphous calcs LOQ 2FN at anterior aspect of scar. Rec L stereo bx x2 if clinically desired. BI-RADS 6

## 2023-12-28 NOTE — HISTORY OF PRESENT ILLNESS
[FreeTextEntry1] : Patient is a 44 yo F who presents today for post-op evaluation. S/P L TM w/ YOSSI recon (Dr. Zhao) on 12/15/23. Final surgical pathology yielded no residual carcinoma, surgical site changes, IDP, fibrocystic changes. S/p L nloc excisional bx x2 for ADH/FEA and terminal duct excision for bloody nipple discharge on 10/17/23 (Age 43). 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 SITE 2) 3:00 4FN- 1.2cm DCIS, ADH/FEA, <1mm medial margin (which abuts lateral margin of site 1), <1mm posterior margin, ER+ (>99%), CO+ (>99%). SITE 3) Terminal duct- fibrocystic changes. Patient met with Dr. Zhao 11/6/23 to discuss possible L TM vs re-excision w/ tissue arrangement, patient opted for TM. Family history of breast cancer in mother (age 73, BRCA/panel negative tested 2023). Patient is BRCA1/2/full panel negative (8/2023). Patient denies palpable masses or skin changes.  TNM Staging- pTis, Nx, Mx  6/22/23: B/l MG/US (L nipple discharge)- extremely dense, L lateral scattered groups of layering and punctate calcs (rec 6M f/u L dxMG), US-multiple subcm cysts b/l (rec 6 M f/u b/l US), L 9:00 0FN 0.4 cm subdermal oval partially indistinct hypoechogenic shadowing mass (rec L US bx). BIRADS 4. 6/28/23: L US bx- L 9:00 0.4 cm hypoechoic mass (S clip)- Benign PASH and usual epithelial hyperplasia. Benign & Concordant. 8/16/23: MRI- heterogeneously dense, AZRA. No axillary or internal mammary adenopathy, L 9:00 tissue marker clip not assoc w/ suspicious enhancement. (Given L indeterminate calcs on MG and L pathologic nipple discharge rec L dxMG and L stereo bx). BIRADS 4. 8/23/23: L stereo bx x 2- SITE 1) L 2:30 posterior depth micro calcs (cork clip)- Focal ADH in a background of FEA with assoc calcs and fibrocystic changes. High Risk & Concordant. Rec surgical consult. SITE 2) L 3:00 anterior aspect of micro calcs (dumbbell clip)- Focal ADH in background of FEA w/ assoc calcs, fibrocystic changes. High Risk & Concordant. Rec surgical consult. **Of note, there are additional microcalcs anterior to the anterior clip. The anterior clip (dumbbell) is adjacent to a prominent cluster, these were sampled, the clip is at the edge of this cluster. It should be excised. There are additional microcalcs anterior to the anterior cluster. If there is no malignancy at surgery, then they do not need to be excised.  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 SITE 2) 3:00 4FN- 1.2cm DCIS (cribriform/flat types w/ intermediate nuclear grade), ADH/FEA, <1mm medial margin (which abuts lateral margin of site 1), <1mm posterior margin, ER+ (>99%), CO+ (>99%). SITE 3) Terminal duct- fibrocystic changes.  11/27/23: L MG- heterogeneously dense. L postsurgical changes central outer breast. L 1.3cm cluster of amorphous calcs central outer 5FN at posterior aspect of scar. L 0.4cm cluster of amorphous calcs LOQ 2FN at anterior aspect of scar. Rec L stereo bx x2 if clinically desired. BI-RADS 6 12/15/23: Left TM (YOSSI w/ Dr. Zhao)- no residual carcinoma identified, surrounding breast tissue shows surgical site changes, IDP, and fibrocystic changes, benign skeletal muscle, L RA duct excision- benign breast tissue and fibrosis, L internal mammary LN- benign adipose tissue, no LN identified.

## 2023-12-28 NOTE — PHYSICAL EXAM
[Normocephalic] : normocephalic [EOMI] : extra ocular movement intact [Supple] : supple [No Supraclavicular Adenopathy] : no supraclavicular adenopathy [No Cervical Adenopathy] : no cervical adenopathy [de-identified] : small breasted [de-identified] : R breast/axilla/supraclavicular area: No masses, discharge, or adenopathy [de-identified] : well healing surgical incision s/p YOSSI reconstruction

## 2024-01-08 ENCOUNTER — NON-APPOINTMENT (OUTPATIENT)
Age: 44
End: 2024-01-08

## 2024-01-08 ENCOUNTER — APPOINTMENT (OUTPATIENT)
Dept: PLASTIC SURGERY | Facility: CLINIC | Age: 44
End: 2024-01-08
Payer: COMMERCIAL

## 2024-01-08 DIAGNOSIS — Z42.1 ENCOUNTER FOR BREAST RECONSTRUCTION FOLLOWING MASTECTOMY: ICD-10-CM

## 2024-01-08 PROCEDURE — 99024 POSTOP FOLLOW-UP VISIT: CPT

## 2024-01-08 RX ORDER — OXYCODONE AND ACETAMINOPHEN 5; 325 MG/1; MG/1
5-325 TABLET ORAL
Qty: 20 | Refills: 0 | Status: DISCONTINUED | COMMUNITY
Start: 2023-12-05 | End: 2024-01-08

## 2024-01-08 RX ORDER — DIAZEPAM 5 MG/1
5 TABLET ORAL EVERY 8 HOURS
Qty: 10 | Refills: 0 | Status: DISCONTINUED | COMMUNITY
Start: 2023-12-24 | End: 2024-01-08

## 2024-01-08 RX ORDER — ASPIRIN 325 MG/1
325 TABLET, FILM COATED ORAL
Qty: 10 | Refills: 0 | Status: DISCONTINUED | COMMUNITY
Start: 2023-12-05 | End: 2024-01-08

## 2024-01-08 RX ORDER — DIAZEPAM 5 MG/1
5 TABLET ORAL
Qty: 15 | Refills: 0 | Status: DISCONTINUED | COMMUNITY
Start: 2023-12-05 | End: 2024-01-08

## 2024-01-08 RX ORDER — OXYCODONE 5 MG/1
5 TABLET ORAL
Qty: 12 | Refills: 0 | Status: DISCONTINUED | COMMUNITY
Start: 2023-12-24 | End: 2024-01-08

## 2024-01-08 NOTE — HISTORY OF PRESENT ILLNESS
[FreeTextEntry1] : 42 y/o female presents 24 days s/p stacked YOSSI flap left breast reconstruction on 12/15/23. Denies any f/c/n/v, denies need for pain medications. She has c/o itchiness and rash. Flaps healthy, soft. All incisions intact. No collections, no infections.  No strenuous activity x 3 weeks  Benedryl OTC to rash Follow up in 3-4 months to reassess for second stage revision.

## 2024-03-21 ENCOUNTER — APPOINTMENT (OUTPATIENT)
Dept: HEMATOLOGY ONCOLOGY | Facility: CLINIC | Age: 44
End: 2024-03-21
Payer: COMMERCIAL

## 2024-03-21 VITALS
RESPIRATION RATE: 18 BRPM | WEIGHT: 121 LBS | OXYGEN SATURATION: 99 % | DIASTOLIC BLOOD PRESSURE: 68 MMHG | HEART RATE: 72 BPM | TEMPERATURE: 98 F | HEIGHT: 64 IN | SYSTOLIC BLOOD PRESSURE: 104 MMHG | BODY MASS INDEX: 20.66 KG/M2

## 2024-03-21 PROCEDURE — G2211 COMPLEX E/M VISIT ADD ON: CPT

## 2024-03-21 PROCEDURE — 99213 OFFICE O/P EST LOW 20 MIN: CPT

## 2024-03-21 RX ORDER — TAMOXIFEN CITRATE 20 MG/1
20 TABLET, FILM COATED ORAL DAILY
Qty: 90 | Refills: 1 | Status: ACTIVE | COMMUNITY
Start: 2023-12-28 | End: 1900-01-01

## 2024-03-21 NOTE — HISTORY OF PRESENT ILLNESS
[Disease: _____________________] : Disease: [unfilled] [T: ___] : T[unfilled] [N: ___] : N[unfilled] [M: ___] : M[unfilled] [AJCC Stage: ____] : AJCC Stage: [unfilled] [de-identified] : 42 yo F with a history of DCIS s/p 10/17/23 L nloc excisional bx x2 and 12/15/23 L TM w/ YOSSI reconstruction.  Family history of breast cancer in mother (age 73, BRCA/panel negative tested 2023).   6/22/23: B/l MG/US (L nipple discharge)- extremely dense, L lateral scattered groups of layering and punctate calcs (rec 6M f/u L dxMG), US-multiple subcm cysts b/l (rec 6 M f/u b/l US), L 9:00 0FN 0.4 cm subdermal oval partially indistinct hypoechogenic shadowing mass (rec L US bx). BIRADS 4.  6/28/23: L US bx- L 9:00 0.4 cm hypoechoic mass (S clip)- Benign PASH and usual epithelial hyperplasia. Benign & Concordant.  8/16/23: MRI- heterogeneously dense, AZRA. No axillary or internal mammary adenopathy, L 9:00 tissue marker clip not assoc w/ suspicious enhancement. (Given L indeterminate calcs on MG and L pathologic nipple discharge rec L dxMG and L stereo bx). BIRADS 4.  8/23/23: L stereo bx x 2- SITE 1) L 2:30 posterior depth micro calcs (cork clip)- Focal ADH in a background of FEA with assoc calcs and fibrocystic changes. High Risk & Concordant. Rec surgical consult. SITE 2) L 3:00 anterior aspect of micro calcs (dumbbell clip)- Focal ADH in background of FEA w/ assoc calcs, fibrocystic changes. High Risk & Concordant. Rec surgical consult. **Of note, there are additional microcalcs anterior to the anterior clip. The anterior clip (dumbbell) is adjacent to a prominent cluster, these were sampled, the clip is at the edge of this cluster. It should be excised. There are additional microcalcs anterior to the anterior cluster. If there is no malignancy at surgery, then they do not need to be excised.   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  SITE 2) 3:00 4FN- 1.2cm DCIS (cribriform/flat types w/ intermediate nuclear grade), ADH/FEA, <1mm medial margin (which abuts lateral margin of site 1), <1mm posterior margin, ER+ (>99%), IA+ (>99%).  SITE 3) Terminal duct- fibrocystic changes.   11/27/23: L MG- heterogeneously dense. L postsurgical changes central outer breast. L 1.3cm cluster of amorphous calcs central outer 5FN at posterior aspect of scar. L 0.4cm cluster of amorphous calcs LOQ 2FN at anterior aspect of scar. Rec L stereo bx x2 if clinically desired. BI-RADS 6  12/6/23 CT ANGIO ABD PELV YOSSI IC  1. The anatomy of the deep inferior epigastric arteries and perforators are described above. The best  is labeled R1. 2. No evidence of metastatic breast cancer in the abdomen or pelvis.  12/15/23 L mastectomy: No residual carcinoma identified.   1/8/24 US UNILATERAL RIGHT BREAST LIMITED: No findings suspicious for malignancy. The cysts at 12:00 have resolved. The cluster at 9:00 is larger. The patient should return is June 2023 for her right breast mammogram and sonogram. She is status post left mastectomy.  [de-identified] : ER 99%, ME 99% [de-identified] : ductal carcinoma in situ [de-identified] : BRCA1/2/full panel negative (8/2023) [de-identified] : Started tamoxifen in January, takes it at night.  Had dry mouth the first few weeks which resolved.  She noticed menstrual changes, more brown light spotting.  Had initial hot flashes which subsided.  She is undergoing physical therapy for abdominal and shoulder tightness.

## 2024-03-21 NOTE — PAST MEDICAL HISTORY
[Menarche Age ____] : age at menarche was [unfilled] [Regular Cycle Intervals] : have been regular [Total Preg ___] : G[unfilled] [Live Births ___] : P[unfilled]  [Age At Live Birth ___] : Age at live birth: [unfilled] [Definite ___ (Date)] : the last menstrual period was [unfilled] [History of Hormone Replacement Treatment] : has no history of hormone replacement treatment [FreeTextEntry2] : 1 miscarriage [FreeTextEntry6] : no [FreeTextEntry7] : no [FreeTextEntry8] : yes

## 2024-03-21 NOTE — BEGINNING OF VISIT
[0] : 1) Little interest or pleasure doing things: Not at all (0) [PHQ-2 Negative] : PHQ-2 Negative [[id number]] : 0

## 2024-03-21 NOTE — BEGINNING OF VISIT
[0] : 1) Little interest or pleasure doing things: Not at all (0) [[id number]] : 0 [PHQ-2 Negative] : PHQ-2 Negative

## 2024-03-21 NOTE — HISTORY OF PRESENT ILLNESS
[Disease: _____________________] : Disease: [unfilled] [T: ___] : T[unfilled] [N: ___] : N[unfilled] [M: ___] : M[unfilled] [AJCC Stage: ____] : AJCC Stage: [unfilled] [de-identified] : 44 yo F with a history of DCIS s/p 10/17/23 L nloc excisional bx x2 and 12/15/23 L TM w/ YOSSI reconstruction.  Family history of breast cancer in mother (age 73, BRCA/panel negative tested 2023).   6/22/23: B/l MG/US (L nipple discharge)- extremely dense, L lateral scattered groups of layering and punctate calcs (rec 6M f/u L dxMG), US-multiple subcm cysts b/l (rec 6 M f/u b/l US), L 9:00 0FN 0.4 cm subdermal oval partially indistinct hypoechogenic shadowing mass (rec L US bx). BIRADS 4.  6/28/23: L US bx- L 9:00 0.4 cm hypoechoic mass (S clip)- Benign PASH and usual epithelial hyperplasia. Benign & Concordant.  8/16/23: MRI- heterogeneously dense, AZRA. No axillary or internal mammary adenopathy, L 9:00 tissue marker clip not assoc w/ suspicious enhancement. (Given L indeterminate calcs on MG and L pathologic nipple discharge rec L dxMG and L stereo bx). BIRADS 4.  8/23/23: L stereo bx x 2- SITE 1) L 2:30 posterior depth micro calcs (cork clip)- Focal ADH in a background of FEA with assoc calcs and fibrocystic changes. High Risk & Concordant. Rec surgical consult. SITE 2) L 3:00 anterior aspect of micro calcs (dumbbell clip)- Focal ADH in background of FEA w/ assoc calcs, fibrocystic changes. High Risk & Concordant. Rec surgical consult. **Of note, there are additional microcalcs anterior to the anterior clip. The anterior clip (dumbbell) is adjacent to a prominent cluster, these were sampled, the clip is at the edge of this cluster. It should be excised. There are additional microcalcs anterior to the anterior cluster. If there is no malignancy at surgery, then they do not need to be excised.   10/17/23: L nloc lumpx x2 & terminal duct-  SITE 1) 3:00 2FN- 0.5cm DCIS (cribriform type w/ intermediate nuclear grade), ADH/FEA, focally + superior margin, <1mm medial margin, ER+ (>99%), MS+ (>99%).  SITE 2) 3:00 4FN- 1.2cm DCIS (cribriform/flat types w/ intermediate nuclear grade), ADH/FEA, <1mm medial margin (which abuts lateral margin of site 1), <1mm posterior margin, ER+ (>99%), MS+ (>99%).  SITE 3) Terminal duct- fibrocystic changes.   11/27/23: L MG- heterogeneously dense. L postsurgical changes central outer breast. L 1.3cm cluster of amorphous calcs central outer 5FN at posterior aspect of scar. L 0.4cm cluster of amorphous calcs LOQ 2FN at anterior aspect of scar. Rec L stereo bx x2 if clinically desired. BI-RADS 6  12/6/23 CT ANGIO ABD PELV YOSSI IC  1. The anatomy of the deep inferior epigastric arteries and perforators are described above. The best  is labeled R1. 2. No evidence of metastatic breast cancer in the abdomen or pelvis.  12/15/23 L mastectomy: No residual carcinoma identified.   1/8/24 US UNILATERAL RIGHT BREAST LIMITED: No findings suspicious for malignancy. The cysts at 12:00 have resolved. The cluster at 9:00 is larger. The patient should return is June 2023 for her right breast mammogram and sonogram. She is status post left mastectomy.  [de-identified] : ductal carcinoma in situ [de-identified] : ER 99%, ME 99% [de-identified] : BRCA1/2/full panel negative (8/2023) [de-identified] : Started tamoxifen in January, takes it at night.  Had dry mouth the first few weeks which resolved.  She noticed menstrual changes, more brown light spotting.  Had initial hot flashes which subsided.  She is undergoing physical therapy for abdominal and shoulder tightness.

## 2024-05-06 ENCOUNTER — APPOINTMENT (OUTPATIENT)
Dept: PLASTIC SURGERY | Facility: CLINIC | Age: 44
End: 2024-05-06
Payer: COMMERCIAL

## 2024-05-06 VITALS — WEIGHT: 120 LBS | OXYGEN SATURATION: 99 % | HEART RATE: 78 BPM | HEIGHT: 64 IN | BODY MASS INDEX: 20.49 KG/M2

## 2024-05-06 DIAGNOSIS — N65.0 DEFORMITY OF RECONSTRUCTED BREAST: ICD-10-CM

## 2024-05-06 PROCEDURE — 99213 OFFICE O/P EST LOW 20 MIN: CPT

## 2024-05-07 NOTE — SURGICAL HISTORY
[de-identified] : 12/15/23: left YOSSI flap breast reconstruction Alert & oriented; no sensory, motor or coordination deficits, normal reflexes

## 2024-05-07 NOTE — HISTORY OF PRESENT ILLNESS
[FreeTextEntry1] : 44 y/o female presents 5 months s/p stacked YOSSI flap left breast reconstruction on 12/15/23. She reports much improvement with PT. Patient is here to discuss 2nd stage revision surgery.  Flaps healthy, soft. All incisions intact. No collections, no infections. Subtle contour irregularity left upper pole, lower IMF on the left, L>R Abdominal +coning b/l, irregular scar, prominent umbilicus   Today we discussed all options for breast reconstruction revision, left IMF plication, fat grafting to upper pole, skin island excision, liposuction to reduce size?, bilateral dog ear excision b/l. The nature of each surgery, its risks, benefits, alternatives, expected postoperative course, recovery and long term results were discussed in detail. All questions were answered.

## 2024-05-30 RX ORDER — OXYCODONE AND ACETAMINOPHEN 5; 325 MG/1; MG/1
5-325 TABLET ORAL
Qty: 20 | Refills: 0 | Status: ACTIVE | COMMUNITY
Start: 2024-05-30 | End: 1900-01-01

## 2024-06-06 ENCOUNTER — NON-APPOINTMENT (OUTPATIENT)
Age: 44
End: 2024-06-06

## 2024-06-06 ENCOUNTER — TRANSCRIPTION ENCOUNTER (OUTPATIENT)
Age: 44
End: 2024-06-06

## 2024-06-06 ENCOUNTER — APPOINTMENT (OUTPATIENT)
Dept: BREAST CENTER | Facility: CLINIC | Age: 44
End: 2024-06-06
Payer: COMMERCIAL

## 2024-06-06 VITALS
WEIGHT: 115 LBS | SYSTOLIC BLOOD PRESSURE: 106 MMHG | HEIGHT: 64 IN | HEART RATE: 73 BPM | DIASTOLIC BLOOD PRESSURE: 71 MMHG | BODY MASS INDEX: 19.63 KG/M2

## 2024-06-06 DIAGNOSIS — N64.9 DISORDER OF BREAST, UNSPECIFIED: ICD-10-CM

## 2024-06-06 DIAGNOSIS — D05.12 INTRADUCTAL CARCINOMA IN SITU OF LEFT BREAST: ICD-10-CM

## 2024-06-06 PROCEDURE — 99213 OFFICE O/P EST LOW 20 MIN: CPT

## 2024-06-06 NOTE — PAST MEDICAL HISTORY
[History of Hormone Replacement Treatment] : has no history of hormone replacement treatment [FreeTextEntry2] : 1 miscarriage [FreeTextEntry6] : no [FreeTextEntry7] : no [FreeTextEntry8] : yes

## 2024-06-06 NOTE — ASU PATIENT PROFILE, ADULT - NS PREOP UNDERSTANDS INFO
patient instructed to arrive at 08:30am tomorrow, no solid food/dairy/candy/gum after midnight, water, gatorode allowed before 07:00am, reminded to come with photo ID/insurance card, have an escort, callback number given./yes

## 2024-06-06 NOTE — ASU PATIENT PROFILE, ADULT - NSICDXPASTSURGICALHX_GEN_ALL_CORE_FT
PAST SURGICAL HISTORY:  H/O  section x2    H/O eye surgery right// 8yo    History of appendectomy 17yo    S/P mastectomy, left

## 2024-06-06 NOTE — PHYSICAL EXAM
[de-identified] : R breast/axilla/supraclavicular area: No masses, discharge, or adenopathy [de-identified] : L chest wall/axilla/supraclavicular area: No evidence of recurrence

## 2024-06-06 NOTE — HISTORY OF PRESENT ILLNESS
[FreeTextEntry1] : Patient is a 44 yo F who presents today for breast cancer surveillance. S/p L TM w/ YOSSI recon (Dr. Zhao) on 12/15/23. Final surgical pathology yielded no residual carcinoma. This was following L nloc excisional bx x2 for ADH/FEA and terminal duct excision for bloody nipple discharge on 10/17/23 (age 43). Final surgical pathology yielded multifocal DCIS (ER/MO+) w/ multiple positive margins. Currently on Tamoxifen (Dr. Khan). Family history of breast cancer in mother (age 73, BRCA/panel negative tested 2023). Patient is BRCA/full panel negative (8/2023). Patient denies palpable masses or skin changes.  TNM Staging- pTis, Nx, Mx  6/22/23: B/l MG/US (L nipple discharge)- extremely dense, L lateral scattered groups of layering and punctate calcs (rec 6M f/u L dxMG), US-multiple subcm cysts b/l (rec 6 M f/u b/l US), L 9:00 0FN 0.4 cm subdermal oval partially indistinct hypoechogenic shadowing mass (rec L US bx). BIRADS 4. 6/28/23: L US bx- L 9:00 0.4 cm hypoechoic mass (S clip)- Benign PASH and usual epithelial hyperplasia. Benign & Concordant. 8/16/23: MRI- heterogeneously dense, AZRA. No axillary or internal mammary adenopathy, L 9:00 tissue marker clip not assoc w/ suspicious enhancement. (Given L indeterminate calcs on MG and L pathologic nipple discharge rec L dxMG and L stereo bx). BIRADS 4. 8/23/23: L stereo bx x 2- SITE 1) L 2:30 posterior depth micro calcs (cork clip)- Focal ADH in a background of FEA with assoc calcs and fibrocystic changes. High Risk & Concordant. Rec surgical consult. SITE 2) L 3:00 anterior aspect of micro calcs (dumbbell clip)- Focal ADH in background of FEA w/ assoc calcs, fibrocystic changes. High Risk & Concordant. Rec surgical consult. **Of note, there are additional microcalcs anterior to the anterior clip. The anterior clip (dumbbell) is adjacent to a prominent cluster, these were sampled, the clip is at the edge of this cluster. It should be excised. There are additional microcalcs anterior to the anterior cluster. If there is no malignancy at surgery, then they do not need to be excised.  10/17/23: L nloc lumpx x2 & terminal duct- SITE 1) 3:00 2FN- 0.5cm DCIS (cribriform type w/ intermediate nuclear grade), ADH/FEA, focally + superior margin, <1mm medial margin, ER+ (>99%), MO+ (>99%). SITE 2) 3:00 4FN- 1.2cm DCIS (cribriform/flat types w/ intermediate nuclear grade), ADH/FEA, <1mm medial margin (which abuts lateral margin of site 1), <1mm posterior margin, ER+ (>99%), MO+ (>99%). SITE 3) Terminal duct- fibrocystic changes.  11/27/23: L MG- heterogeneously dense. L postsurgical changes central outer breast. L 1.3cm cluster of amorphous calcs central outer 5FN at posterior aspect of scar. L 0.4cm cluster of amorphous calcs LOQ 2FN at anterior aspect of scar. Rec L stereo bx x2 if clinically desired. BI-RADS 6 12/15/23: L TM (YOSSI w/ Dr. Zhao)- no residual carcinoma identified, surrounding breast tissue shows surgical site changes, IDP, and fibrocystic changes, benign skeletal muscle, L RA duct excision- benign breast tissue and fibrosis, L internal mammary LN- benign adipose tissue, no LN identified. 1/8/24: R US- cysts resolved 12:00, R stable nodule/complicated cyst 0.7 cm 9:00 4FN, R 1.2 cm cluster of cysts 9:00 2FN is larger, rec f/u at time of annual R MG/US in June. BIRADS 3. 6/6/24: R MG/US-heterogeneously dense, benign stable mass and [id number] 2

## 2024-06-06 NOTE — ASU PATIENT PROFILE, ADULT - FALL HARM RISK - UNIVERSAL INTERVENTIONS
Bed in lowest position, wheels locked, appropriate side rails in place/Call bell, personal items and telephone in reach/Instruct patient to call for assistance before getting out of bed or chair/Non-slip footwear when patient is out of bed/[place] to call system/Physically safe environment - no spills, clutter or unnecessary equipment/Purposeful Proactive Rounding/Room/bathroom lighting operational, light cord in reach

## 2024-06-07 ENCOUNTER — OUTPATIENT (OUTPATIENT)
Dept: OUTPATIENT SERVICES | Facility: HOSPITAL | Age: 44
LOS: 1 days | Discharge: HOME | End: 2024-06-07
Payer: COMMERCIAL

## 2024-06-07 ENCOUNTER — TRANSCRIPTION ENCOUNTER (OUTPATIENT)
Age: 44
End: 2024-06-07

## 2024-06-07 VITALS
DIASTOLIC BLOOD PRESSURE: 57 MMHG | OXYGEN SATURATION: 97 % | HEART RATE: 92 BPM | SYSTOLIC BLOOD PRESSURE: 102 MMHG | RESPIRATION RATE: 16 BRPM | TEMPERATURE: 98 F

## 2024-06-07 VITALS
WEIGHT: 114.64 LBS | SYSTOLIC BLOOD PRESSURE: 106 MMHG | TEMPERATURE: 98 F | OXYGEN SATURATION: 100 % | DIASTOLIC BLOOD PRESSURE: 80 MMHG | HEART RATE: 70 BPM | RESPIRATION RATE: 16 BRPM | HEIGHT: 64 IN

## 2024-06-07 DIAGNOSIS — Z90.49 ACQUIRED ABSENCE OF OTHER SPECIFIED PARTS OF DIGESTIVE TRACT: Chronic | ICD-10-CM

## 2024-06-07 DIAGNOSIS — Z98.891 HISTORY OF UTERINE SCAR FROM PREVIOUS SURGERY: Chronic | ICD-10-CM

## 2024-06-07 DIAGNOSIS — Z90.12 ACQUIRED ABSENCE OF LEFT BREAST AND NIPPLE: Chronic | ICD-10-CM

## 2024-06-07 DIAGNOSIS — Z98.890 OTHER SPECIFIED POSTPROCEDURAL STATES: Chronic | ICD-10-CM

## 2024-06-07 PROCEDURE — 13102 CMPLX RPR TRUNK ADDL 5CM/<: CPT | Mod: 59

## 2024-06-07 PROCEDURE — 15771 GRFG AUTOL FAT LIPO 50 CC/<: CPT

## 2024-06-07 PROCEDURE — 13101 CMPLX RPR TRUNK 2.6-7.5 CM: CPT | Mod: 59

## 2024-06-07 PROCEDURE — 19380 REVJ RECONSTRUCTED BREAST: CPT | Mod: LT

## 2024-06-07 RX ORDER — FENTANYL CITRATE 50 UG/ML
25 INJECTION INTRAVENOUS
Refills: 0 | Status: DISCONTINUED | OUTPATIENT
Start: 2024-06-07 | End: 2024-06-07

## 2024-06-07 RX ORDER — ACETAMINOPHEN 500 MG
1000 TABLET ORAL ONCE
Refills: 0 | Status: COMPLETED | OUTPATIENT
Start: 2024-06-07 | End: 2024-06-07

## 2024-06-07 RX ORDER — SODIUM CHLORIDE 9 MG/ML
1000 INJECTION, SOLUTION INTRAVENOUS
Refills: 0 | Status: DISCONTINUED | OUTPATIENT
Start: 2024-06-07 | End: 2024-06-07

## 2024-06-07 RX ORDER — TAMOXIFEN CITRATE 20 MG/1
1 TABLET, FILM COATED ORAL
Refills: 0 | DISCHARGE

## 2024-06-07 RX ORDER — ONDANSETRON 8 MG/1
4 TABLET, FILM COATED ORAL ONCE
Refills: 0 | Status: DISCONTINUED | OUTPATIENT
Start: 2024-06-07 | End: 2024-06-07

## 2024-06-07 RX ORDER — APREPITANT 80 MG/1
40 CAPSULE ORAL ONCE
Refills: 0 | Status: COMPLETED | OUTPATIENT
Start: 2024-06-07 | End: 2024-06-07

## 2024-06-07 RX ADMIN — APREPITANT 40 MILLIGRAM(S): 80 CAPSULE ORAL at 08:53

## 2024-06-07 RX ADMIN — Medication 1000 MILLIGRAM(S): at 08:53

## 2024-06-07 NOTE — BRIEF OPERATIVE NOTE - NSICDXBRIEFPREOP_GEN_ALL_CORE_FT
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

## 2024-06-07 NOTE — BRIEF OPERATIVE NOTE - ELECTIVE PROCEDURE
Yes Topical Steroids Applications Pregnancy And Lactation Text: Most topical steroids are considered safe to use during pregnancy and lactation.  Any topical steroid applied to the breast or nipple should be washed off before breastfeeding.

## 2024-06-07 NOTE — BRIEF OPERATIVE NOTE - NSICDXBRIEFPROCEDURE_GEN_ALL_CORE_FT
PROCEDURES:  Revision of reconstruction of left breast 07-Jun-2024 11:41:26  Martha Beckham  Fat grafting 07-Jun-2024 11:41:35  Martha Beckham

## 2024-06-07 NOTE — ASU DISCHARGE PLAN (ADULT/PEDIATRIC) - NS MD DC FALL RISK RISK
For information on Fall & Injury Prevention, visit: [email or web address] OR  [email or web address] OR  https://www.cdc.gov/steadi/patient.html

## 2024-06-08 PROBLEM — C50.912 MALIGNANT NEOPLASM OF UNSPECIFIED SITE OF LEFT FEMALE BREAST: Chronic | Status: ACTIVE | Noted: 2024-06-06

## 2024-06-21 ENCOUNTER — APPOINTMENT (OUTPATIENT)
Dept: PLASTIC SURGERY | Facility: CLINIC | Age: 44
End: 2024-06-21
Payer: COMMERCIAL

## 2024-06-21 PROCEDURE — 99024 POSTOP FOLLOW-UP VISIT: CPT

## 2024-06-24 ENCOUNTER — APPOINTMENT (OUTPATIENT)
Dept: BREAST CENTER | Facility: CLINIC | Age: 44
End: 2024-06-24

## 2024-06-25 NOTE — SURGICAL HISTORY
[de-identified] : 12/15/2023: left YOSSI flap breast reconstruction [de-identified] : 06/07/2024: left breast recon revision, breast fat grafting, abdominal donor site revision

## 2024-06-25 NOTE — HISTORY OF PRESENT ILLNESS
[FreeTextEntry1] : 42 y/o female presents 14 days s/p left breast recon revision, breast fat grafting, abdominal donor site revision. Good breast shape and symmetry. Wound care and scar management reviewed. follow up in 6 months to assess final results.

## 2024-10-10 ENCOUNTER — APPOINTMENT (OUTPATIENT)
Dept: HEMATOLOGY ONCOLOGY | Facility: CLINIC | Age: 44
End: 2024-10-10
Payer: COMMERCIAL

## 2024-10-10 VITALS
HEIGHT: 64 IN | SYSTOLIC BLOOD PRESSURE: 110 MMHG | WEIGHT: 118 LBS | BODY MASS INDEX: 20.14 KG/M2 | OXYGEN SATURATION: 99 % | TEMPERATURE: 98.2 F | DIASTOLIC BLOOD PRESSURE: 72 MMHG | HEART RATE: 86 BPM | RESPIRATION RATE: 18 BRPM

## 2024-10-10 DIAGNOSIS — D05.12 INTRADUCTAL CARCINOMA IN SITU OF LEFT BREAST: ICD-10-CM

## 2024-10-10 PROCEDURE — 99213 OFFICE O/P EST LOW 20 MIN: CPT

## 2024-10-10 PROCEDURE — G2211 COMPLEX E/M VISIT ADD ON: CPT | Mod: NC

## 2024-12-13 ENCOUNTER — APPOINTMENT (OUTPATIENT)
Dept: BREAST CENTER | Facility: CLINIC | Age: 44
End: 2024-12-13

## 2024-12-18 ENCOUNTER — NON-APPOINTMENT (OUTPATIENT)
Age: 44
End: 2024-12-18

## 2024-12-26 ENCOUNTER — NON-APPOINTMENT (OUTPATIENT)
Age: 44
End: 2024-12-26

## 2025-01-13 ENCOUNTER — APPOINTMENT (OUTPATIENT)
Dept: PLASTIC SURGERY | Facility: CLINIC | Age: 45
End: 2025-01-13
Payer: COMMERCIAL

## 2025-01-13 PROCEDURE — 99213 OFFICE O/P EST LOW 20 MIN: CPT

## 2025-01-17 ENCOUNTER — OUTPATIENT (OUTPATIENT)
Dept: OUTPATIENT SERVICES | Facility: HOSPITAL | Age: 45
LOS: 1 days | End: 2025-01-17
Payer: COMMERCIAL

## 2025-01-17 ENCOUNTER — APPOINTMENT (OUTPATIENT)
Dept: MRI IMAGING | Facility: HOSPITAL | Age: 45
End: 2025-01-17

## 2025-01-17 DIAGNOSIS — Z98.890 OTHER SPECIFIED POSTPROCEDURAL STATES: Chronic | ICD-10-CM

## 2025-01-17 DIAGNOSIS — Z90.49 ACQUIRED ABSENCE OF OTHER SPECIFIED PARTS OF DIGESTIVE TRACT: Chronic | ICD-10-CM

## 2025-01-17 DIAGNOSIS — Z98.891 HISTORY OF UTERINE SCAR FROM PREVIOUS SURGERY: Chronic | ICD-10-CM

## 2025-01-17 DIAGNOSIS — Z90.12 ACQUIRED ABSENCE OF LEFT BREAST AND NIPPLE: Chronic | ICD-10-CM

## 2025-01-17 PROCEDURE — A9585: CPT

## 2025-01-17 PROCEDURE — C8908: CPT

## 2025-01-17 PROCEDURE — C8937: CPT

## 2025-01-17 PROCEDURE — 77049 MRI BREAST C-+ W/CAD BI: CPT | Mod: 26

## 2025-01-24 ENCOUNTER — APPOINTMENT (OUTPATIENT)
Dept: BREAST CENTER | Facility: CLINIC | Age: 45
End: 2025-01-24
Payer: COMMERCIAL

## 2025-01-24 VITALS
HEIGHT: 64 IN | WEIGHT: 122 LBS | HEART RATE: 85 BPM | SYSTOLIC BLOOD PRESSURE: 112 MMHG | DIASTOLIC BLOOD PRESSURE: 72 MMHG | BODY MASS INDEX: 20.83 KG/M2

## 2025-01-24 DIAGNOSIS — R92.8 OTHER ABNORMAL AND INCONCLUSIVE FINDINGS ON DIAGNOSTIC IMAGING OF BREAST: ICD-10-CM

## 2025-01-24 DIAGNOSIS — N64.52 NIPPLE DISCHARGE: ICD-10-CM

## 2025-01-24 DIAGNOSIS — Z85.3 PERSONAL HISTORY OF MALIGNANT NEOPLASM OF BREAST: ICD-10-CM

## 2025-01-24 DIAGNOSIS — Z08 ENCOUNTER FOR FOLLOW-UP EXAMINATION AFTER COMPLETED TREATMENT FOR MALIGNANT NEOPLASM: ICD-10-CM

## 2025-01-24 DIAGNOSIS — Z85.3 ENCOUNTER FOR FOLLOW-UP EXAMINATION AFTER COMPLETED TREATMENT FOR MALIGNANT NEOPLASM: ICD-10-CM

## 2025-01-24 PROCEDURE — 99213 OFFICE O/P EST LOW 20 MIN: CPT

## 2025-05-30 ENCOUNTER — NON-APPOINTMENT (OUTPATIENT)
Age: 45
End: 2025-05-30

## 2025-06-07 ENCOUNTER — NON-APPOINTMENT (OUTPATIENT)
Age: 45
End: 2025-06-07

## 2025-06-09 ENCOUNTER — APPOINTMENT (OUTPATIENT)
Dept: BREAST CENTER | Facility: CLINIC | Age: 45
End: 2025-06-09
Payer: COMMERCIAL

## 2025-06-09 ENCOUNTER — NON-APPOINTMENT (OUTPATIENT)
Age: 45
End: 2025-06-09

## 2025-06-09 VITALS
DIASTOLIC BLOOD PRESSURE: 69 MMHG | HEART RATE: 71 BPM | HEIGHT: 64 IN | SYSTOLIC BLOOD PRESSURE: 108 MMHG | WEIGHT: 126 LBS | BODY MASS INDEX: 21.51 KG/M2

## 2025-06-09 PROCEDURE — 99213 OFFICE O/P EST LOW 20 MIN: CPT

## (undated) DEVICE — SYR CATH TIP 2 OZ

## (undated) DEVICE — SUT NYLON 8-0 5" DRM6

## (undated) DEVICE — Device

## (undated) DEVICE — SYR LUER LOK 5CC

## (undated) DEVICE — GLV 8 PROTEXIS (CREAM) MICRO

## (undated) DEVICE — BAG SPONGE COUNTER EZ

## (undated) DEVICE — DRAPE TOP SHEET 53" X 101"

## (undated) DEVICE — DRSG DERMABOND 0.7ML

## (undated) DEVICE — DRAIN JACKSON PRATT 15FR ROUND END W TROCAR

## (undated) DEVICE — SUT VICRYL 2-0 27" CT-1

## (undated) DEVICE — SUT SILK 2-0 30" PSL

## (undated) DEVICE — SYR ASEPTO

## (undated) DEVICE — SUCTION YANKAUER BULBOUS TIP W VENT

## (undated) DEVICE — SYS RESOLVE FAT PROCESSING 1 UNIT

## (undated) DEVICE — ELCTR BOVIE PENCIL HANDPIECE ROCKER SWITCH 15FT

## (undated) DEVICE — BLADE SURGICAL #15 CARBON

## (undated) DEVICE — DOPPLER PROBE  CABLE

## (undated) DEVICE — PREP CHLORAPREP HI-LITE ORANGE 26ML

## (undated) DEVICE — SUT NYLON 9-0 5" HSV6

## (undated) DEVICE — NDL HYPO SAFE 25G X 1.5" (ORANGE)

## (undated) DEVICE — VENODYNE/SCD SLEEVE CALF MEDIUM

## (undated) DEVICE — MARKING PEN W RULER

## (undated) DEVICE — SYR LUER LOK 3CC

## (undated) DEVICE — CLAMP DBL VENOUS SM 20G/MM2

## (undated) DEVICE — CANNULA MICROAIRE FLARED MERCEDES 4MM 30CM

## (undated) DEVICE — SYR LUER LOK 10CC

## (undated) DEVICE — PACK BREAST RECONSTRUCTION

## (undated) DEVICE — LAP PAD 18 X 18"

## (undated) DEVICE — DRAIN RESERVOIR FOR JACKSON PRATT 100CC CARDINAL

## (undated) DEVICE — SYR LUER LOK 30CC

## (undated) DEVICE — WARMING BLANKET LOWER ADULT

## (undated) DEVICE — SPEAR SURG EYE WECK-CELL CELOS

## (undated) DEVICE — ELCTR BOVIE TIP NEEDLE INSULATED 2.8" EDGE

## (undated) DEVICE — GEL AQUSNC PACKET 20GR

## (undated) DEVICE — SPONGE SURGICAL STRIP 1" X 6"

## (undated) DEVICE — PACK UPPER BODY

## (undated) DEVICE — ELCTR BOVIE TIP BLADE MEGADYNE E-Z CLEAN 4" EXTENDED

## (undated) DEVICE — LONE STAR ELASTIC STAY HOOK 12MM BLUNT

## (undated) DEVICE — CLAMP MICROVASCULAR SINGLE  1-2MM

## (undated) DEVICE — DRAPE MAGNETIC INSTRUMENT MEDIUM

## (undated) DEVICE — BLADE SURGICAL #10 CARBON

## (undated) DEVICE — SUT STRATAFIX SPIRAL MONOCRYL PLUS 3-0 30CM PS-2 UNDYED

## (undated) DEVICE — SUT SILK 2-0 18" PS

## (undated) DEVICE — DRAPE MAYO STAND 30"

## (undated) DEVICE — SUT MONOCRYL 4-0 18" P-3 UNDYED

## (undated) DEVICE — SUT VICRYL 2-0 27" CT-1 UNDYED

## (undated) DEVICE — FRAZIER SUCTION TIP 10FR

## (undated) DEVICE — ELCTR STRYKER NEPTUNE BLADE COATED, INSULATED 70MM

## (undated) DEVICE — CANISTER SPECIMEN CONVERTOR PLASTIC

## (undated) DEVICE — CANNULA ANT CHMBR 27GX22MM

## (undated) DEVICE — ELCTR STRYKER NEPTUNE SMOKE EVACUATION PENCIL (GREEN)

## (undated) DEVICE — ELCTR PENCIL SMOKE EVACUATOR COATED PUSH BUTTON 70MM

## (undated) DEVICE — SUT MONOCRYL 3-0 27" PS-2 UNDYED

## (undated) DEVICE — PACK EENT

## (undated) DEVICE — BIPOLAR FORCEP KIRWAN JEWELERS STR 4" X 0.4MM W 12FT CORD (GREEN)

## (undated) DEVICE — SUT PLAIN GUT FAST ABSORBING 5-0 PC-1

## (undated) DEVICE — WARMING BLANKET UPPER ADULT

## (undated) DEVICE — SUT STRATAFIX SPIRAL MONOCRYL PLUS 4-0 14CM PS-2 UNDYED

## (undated) DEVICE — SUT VICRYL 2-0 27" SH

## (undated) DEVICE — SOL ANTI FOG

## (undated) DEVICE — DRAPE CAMERA VIDEO 7"X96"

## (undated) DEVICE — SUT MONOCRYL 3-0 18" PS-1

## (undated) DEVICE — NDL NERVE STIM 22GA X 2IN 30 DEG

## (undated) DEVICE — DRAPE IOBAN 23" X 23"

## (undated) DEVICE — DRAPE SURGICAL #1010

## (undated) DEVICE — DRAPE PROBE COVER 5" X 96"

## (undated) DEVICE — NDL HYPO SAFE 18G X 1.5" (PINK)

## (undated) DEVICE — DRAPE TOWEL BLUE 17" X 24"

## (undated) DEVICE — ELCTR BOVIE TIP BLADE INSULATED 2.75" EDGE

## (undated) DEVICE — TONGUE DEPRESSOR

## (undated) DEVICE — MERCIAN VISABILITY BACKROUND GREEN

## (undated) DEVICE — FOLEY TRAY 16FR 5CC LF UMETER CLOSED

## (undated) DEVICE — CANNULA MICROAIRE FLARED MERCEDES 5MM 30CM

## (undated) DEVICE — DRAPE LIGHT HANDLE COVER (BLUE)

## (undated) DEVICE — DRSG DERMABOND PRINEO 22CM

## (undated) DEVICE — DRSG STOCKINETTE TUBULAR COTTON 2PLY 6X60"

## (undated) DEVICE — DRSG DERMABOND PRINEO 60CM

## (undated) DEVICE — DRSG GAUZE FLUFF 1PLY 18X30"

## (undated) DEVICE — ELCTR BOVIE PENCIL BLADE 10FT

## (undated) DEVICE — GLV 8 PROTEXIS (WHITE)

## (undated) DEVICE — TUBING MICROAIRE ASPIRATION SET 12FT

## (undated) DEVICE — ONETRAC LIGHTED RETRACTOR 135 X 30MM DISP

## (undated) DEVICE — SUT NYLON 9-0 5" DRM5

## (undated) DEVICE — SUT PROLENE 4-0 18" PS-2

## (undated) DEVICE — STAPLER SKIN PROXIMATE

## (undated) DEVICE — PACK PROC UNIV MAYO STAND 29118